# Patient Record
Sex: FEMALE | Race: WHITE | NOT HISPANIC OR LATINO
[De-identification: names, ages, dates, MRNs, and addresses within clinical notes are randomized per-mention and may not be internally consistent; named-entity substitution may affect disease eponyms.]

---

## 2017-11-11 PROBLEM — Z00.00 ENCOUNTER FOR PREVENTIVE HEALTH EXAMINATION: Status: ACTIVE | Noted: 2017-11-11

## 2017-11-16 ENCOUNTER — APPOINTMENT (OUTPATIENT)
Dept: MRI IMAGING | Facility: CLINIC | Age: 62
End: 2017-11-16
Payer: COMMERCIAL

## 2017-11-16 ENCOUNTER — OUTPATIENT (OUTPATIENT)
Dept: OUTPATIENT SERVICES | Facility: HOSPITAL | Age: 62
LOS: 1 days | End: 2017-11-16
Payer: COMMERCIAL

## 2017-11-16 DIAGNOSIS — Z00.8 ENCOUNTER FOR OTHER GENERAL EXAMINATION: ICD-10-CM

## 2017-11-16 PROCEDURE — 73718 MRI LOWER EXTREMITY W/O DYE: CPT

## 2017-11-16 PROCEDURE — 73718 MRI LOWER EXTREMITY W/O DYE: CPT | Mod: 26,LT

## 2017-11-22 ENCOUNTER — APPOINTMENT (OUTPATIENT)
Dept: ORTHOPEDIC SURGERY | Facility: CLINIC | Age: 62
End: 2017-11-22
Payer: COMMERCIAL

## 2017-11-22 VITALS
SYSTOLIC BLOOD PRESSURE: 130 MMHG | WEIGHT: 175 LBS | HEART RATE: 73 BPM | DIASTOLIC BLOOD PRESSURE: 83 MMHG | HEIGHT: 66 IN | BODY MASS INDEX: 28.12 KG/M2

## 2017-11-22 DIAGNOSIS — Z82.62 FAMILY HISTORY OF OSTEOPOROSIS: ICD-10-CM

## 2017-11-22 DIAGNOSIS — Z78.9 OTHER SPECIFIED HEALTH STATUS: ICD-10-CM

## 2017-11-22 DIAGNOSIS — Z86.018 PERSONAL HISTORY OF OTHER BENIGN NEOPLASM: ICD-10-CM

## 2017-11-22 PROCEDURE — 73610 X-RAY EXAM OF ANKLE: CPT | Mod: LT

## 2017-11-22 PROCEDURE — 99204 OFFICE O/P NEW MOD 45 MIN: CPT

## 2017-11-22 PROCEDURE — 73620 X-RAY EXAM OF FOOT: CPT | Mod: LT

## 2017-12-05 ENCOUNTER — RX RENEWAL (OUTPATIENT)
Age: 62
End: 2017-12-05

## 2018-01-22 ENCOUNTER — APPOINTMENT (OUTPATIENT)
Dept: ORTHOPEDIC SURGERY | Facility: CLINIC | Age: 63
End: 2018-01-22
Payer: COMMERCIAL

## 2018-01-22 VITALS
DIASTOLIC BLOOD PRESSURE: 81 MMHG | SYSTOLIC BLOOD PRESSURE: 122 MMHG | BODY MASS INDEX: 28.12 KG/M2 | HEART RATE: 78 BPM | HEIGHT: 66 IN | WEIGHT: 175 LBS

## 2018-01-22 DIAGNOSIS — M21.6X1 OTHER ACQUIRED DEFORMITIES OF RIGHT FOOT: ICD-10-CM

## 2018-01-22 DIAGNOSIS — M21.6X2 OTHER ACQUIRED DEFORMITIES OF RIGHT FOOT: ICD-10-CM

## 2018-01-22 PROCEDURE — 73630 X-RAY EXAM OF FOOT: CPT | Mod: LT

## 2018-01-22 PROCEDURE — 99214 OFFICE O/P EST MOD 30 MIN: CPT

## 2018-03-02 ENCOUNTER — APPOINTMENT (OUTPATIENT)
Dept: ORTHOPEDIC SURGERY | Facility: CLINIC | Age: 63
End: 2018-03-02
Payer: COMMERCIAL

## 2018-03-02 DIAGNOSIS — M21.172 VARUS DEFORMITY, NOT ELSEWHERE CLASSIFIED, LEFT ANKLE: ICD-10-CM

## 2018-03-02 DIAGNOSIS — M21.6X2 OTHER ACQUIRED DEFORMITIES OF LEFT FOOT: ICD-10-CM

## 2018-03-02 PROCEDURE — 99214 OFFICE O/P EST MOD 30 MIN: CPT

## 2018-03-22 ENCOUNTER — FORM ENCOUNTER (OUTPATIENT)
Age: 63
End: 2018-03-22

## 2018-03-23 ENCOUNTER — OUTPATIENT (OUTPATIENT)
Dept: OUTPATIENT SERVICES | Facility: HOSPITAL | Age: 63
LOS: 1 days | End: 2018-03-23
Payer: COMMERCIAL

## 2018-03-23 ENCOUNTER — APPOINTMENT (OUTPATIENT)
Dept: MRI IMAGING | Facility: CLINIC | Age: 63
End: 2018-03-23
Payer: COMMERCIAL

## 2018-03-23 DIAGNOSIS — Z00.8 ENCOUNTER FOR OTHER GENERAL EXAMINATION: ICD-10-CM

## 2018-03-23 PROCEDURE — 73721 MRI JNT OF LWR EXTRE W/O DYE: CPT

## 2018-03-23 PROCEDURE — 73721 MRI JNT OF LWR EXTRE W/O DYE: CPT | Mod: 26,LT

## 2018-04-04 ENCOUNTER — APPOINTMENT (OUTPATIENT)
Dept: ORTHOPEDIC SURGERY | Facility: CLINIC | Age: 63
End: 2018-04-04
Payer: COMMERCIAL

## 2018-04-04 DIAGNOSIS — M79.672 PAIN IN LEFT FOOT: ICD-10-CM

## 2018-04-04 DIAGNOSIS — M76.72 PERONEAL TENDINITIS, LEFT LEG: ICD-10-CM

## 2018-04-04 DIAGNOSIS — S86.319A STRAIN OF MUSCLE(S) AND TENDON(S) OF PERONEAL MUSCLE GROUP AT LOWER LEG LEVEL, UNSPECIFIED LEG, INITIAL ENCOUNTER: ICD-10-CM

## 2018-04-04 PROCEDURE — 99214 OFFICE O/P EST MOD 30 MIN: CPT

## 2018-10-23 ENCOUNTER — APPOINTMENT (OUTPATIENT)
Dept: OPHTHALMOLOGY | Facility: CLINIC | Age: 63
End: 2018-10-23
Payer: COMMERCIAL

## 2018-10-23 DIAGNOSIS — G43.109 MIGRAINE WITH AURA, NOT INTRACTABLE, W/OUT STATUS MIGRAINOSUS: ICD-10-CM

## 2018-10-23 PROCEDURE — 92133 CPTRZD OPH DX IMG PST SGM ON: CPT

## 2018-10-23 PROCEDURE — 92004 COMPRE OPH EXAM NEW PT 1/>: CPT

## 2018-11-05 ENCOUNTER — OUTPATIENT (OUTPATIENT)
Dept: OUTPATIENT SERVICES | Facility: HOSPITAL | Age: 63
LOS: 1 days | End: 2018-11-05
Payer: COMMERCIAL

## 2018-11-05 ENCOUNTER — TRANSCRIPTION ENCOUNTER (OUTPATIENT)
Age: 63
End: 2018-11-05

## 2018-11-05 ENCOUNTER — APPOINTMENT (OUTPATIENT)
Dept: MAMMOGRAPHY | Facility: CLINIC | Age: 63
End: 2018-11-05
Payer: COMMERCIAL

## 2018-11-05 ENCOUNTER — APPOINTMENT (OUTPATIENT)
Dept: RADIOLOGY | Facility: CLINIC | Age: 63
End: 2018-11-05
Payer: COMMERCIAL

## 2018-11-05 DIAGNOSIS — Z00.8 ENCOUNTER FOR OTHER GENERAL EXAMINATION: ICD-10-CM

## 2018-11-05 PROCEDURE — 77063 BREAST TOMOSYNTHESIS BI: CPT

## 2018-11-05 PROCEDURE — 77067 SCR MAMMO BI INCL CAD: CPT | Mod: 26

## 2018-11-05 PROCEDURE — 77063 BREAST TOMOSYNTHESIS BI: CPT | Mod: 26

## 2018-11-05 PROCEDURE — 77067 SCR MAMMO BI INCL CAD: CPT

## 2018-12-07 ENCOUNTER — APPOINTMENT (OUTPATIENT)
Dept: CARDIOLOGY | Facility: CLINIC | Age: 63
End: 2018-12-07
Payer: COMMERCIAL

## 2018-12-07 ENCOUNTER — NON-APPOINTMENT (OUTPATIENT)
Age: 63
End: 2018-12-07

## 2018-12-07 VITALS
BODY MASS INDEX: 29.73 KG/M2 | DIASTOLIC BLOOD PRESSURE: 75 MMHG | OXYGEN SATURATION: 97 % | HEIGHT: 66 IN | SYSTOLIC BLOOD PRESSURE: 124 MMHG | HEART RATE: 64 BPM | WEIGHT: 185 LBS

## 2018-12-07 VITALS — DIASTOLIC BLOOD PRESSURE: 75 MMHG | OXYGEN SATURATION: 97 % | HEART RATE: 64 BPM | SYSTOLIC BLOOD PRESSURE: 124 MMHG

## 2018-12-07 DIAGNOSIS — R94.31 ABNORMAL ELECTROCARDIOGRAM [ECG] [EKG]: ICD-10-CM

## 2018-12-07 DIAGNOSIS — R00.2 PALPITATIONS: ICD-10-CM

## 2018-12-07 PROCEDURE — 99204 OFFICE O/P NEW MOD 45 MIN: CPT

## 2018-12-07 PROCEDURE — 93000 ELECTROCARDIOGRAM COMPLETE: CPT

## 2018-12-10 ENCOUNTER — APPOINTMENT (OUTPATIENT)
Dept: CARDIOLOGY | Facility: CLINIC | Age: 63
End: 2018-12-10

## 2019-01-05 ENCOUNTER — APPOINTMENT (OUTPATIENT)
Dept: CARDIOLOGY | Facility: CLINIC | Age: 64
End: 2019-01-05
Payer: COMMERCIAL

## 2019-01-05 PROCEDURE — 93306 TTE W/DOPPLER COMPLETE: CPT

## 2019-01-17 ENCOUNTER — TRANSCRIPTION ENCOUNTER (OUTPATIENT)
Age: 64
End: 2019-01-17

## 2019-03-30 ENCOUNTER — TRANSCRIPTION ENCOUNTER (OUTPATIENT)
Age: 64
End: 2019-03-30

## 2019-10-02 ENCOUNTER — APPOINTMENT (OUTPATIENT)
Dept: DERMATOLOGY | Facility: CLINIC | Age: 64
End: 2019-10-02
Payer: COMMERCIAL

## 2019-10-02 DIAGNOSIS — L57.0 ACTINIC KERATOSIS: ICD-10-CM

## 2019-10-02 DIAGNOSIS — L82.1 OTHER SEBORRHEIC KERATOSIS: ICD-10-CM

## 2019-10-02 DIAGNOSIS — R23.8 OTHER SKIN CHANGES: ICD-10-CM

## 2019-10-02 DIAGNOSIS — D18.00 HEMANGIOMA UNSPECIFIED SITE: ICD-10-CM

## 2019-10-02 PROCEDURE — 17000 DESTRUCT PREMALG LESION: CPT

## 2019-10-02 PROCEDURE — 99204 OFFICE O/P NEW MOD 45 MIN: CPT | Mod: 25

## 2019-10-02 RX ORDER — MELOXICAM 7.5 MG/1
7.5 TABLET ORAL
Qty: 30 | Refills: 1 | Status: DISCONTINUED | COMMUNITY
Start: 2017-12-05 | End: 2019-10-02

## 2019-10-02 RX ORDER — ATORVASTATIN CALCIUM 40 MG/1
40 TABLET, FILM COATED ORAL
Qty: 30 | Refills: 0 | Status: DISCONTINUED | COMMUNITY
Start: 2017-11-21 | End: 2019-10-02

## 2020-01-23 ENCOUNTER — APPOINTMENT (OUTPATIENT)
Dept: OBGYN | Facility: CLINIC | Age: 65
End: 2020-01-23
Payer: COMMERCIAL

## 2020-01-23 VITALS
WEIGHT: 190 LBS | BODY MASS INDEX: 30.53 KG/M2 | SYSTOLIC BLOOD PRESSURE: 120 MMHG | DIASTOLIC BLOOD PRESSURE: 70 MMHG | HEIGHT: 66 IN

## 2020-01-23 DIAGNOSIS — N81.4 UTEROVAGINAL PROLAPSE, UNSPECIFIED: ICD-10-CM

## 2020-01-23 PROCEDURE — 82270 OCCULT BLOOD FECES: CPT

## 2020-01-23 PROCEDURE — 99396 PREV VISIT EST AGE 40-64: CPT

## 2020-01-23 PROCEDURE — 99213 OFFICE O/P EST LOW 20 MIN: CPT | Mod: 25

## 2020-02-29 ENCOUNTER — OUTPATIENT (OUTPATIENT)
Dept: OUTPATIENT SERVICES | Facility: HOSPITAL | Age: 65
LOS: 1 days | End: 2020-02-29
Payer: COMMERCIAL

## 2020-02-29 ENCOUNTER — APPOINTMENT (OUTPATIENT)
Dept: MAMMOGRAPHY | Facility: CLINIC | Age: 65
End: 2020-02-29
Payer: COMMERCIAL

## 2020-02-29 DIAGNOSIS — Z00.8 ENCOUNTER FOR OTHER GENERAL EXAMINATION: ICD-10-CM

## 2020-02-29 PROCEDURE — 77063 BREAST TOMOSYNTHESIS BI: CPT

## 2020-02-29 PROCEDURE — 77067 SCR MAMMO BI INCL CAD: CPT

## 2020-02-29 PROCEDURE — 77063 BREAST TOMOSYNTHESIS BI: CPT | Mod: 26

## 2020-02-29 PROCEDURE — 77067 SCR MAMMO BI INCL CAD: CPT | Mod: 26

## 2020-03-06 ENCOUNTER — OUTPATIENT (OUTPATIENT)
Dept: OUTPATIENT SERVICES | Facility: HOSPITAL | Age: 65
LOS: 1 days | End: 2020-03-06
Payer: COMMERCIAL

## 2020-03-06 ENCOUNTER — APPOINTMENT (OUTPATIENT)
Dept: RADIOLOGY | Facility: CLINIC | Age: 65
End: 2020-03-06
Payer: COMMERCIAL

## 2020-03-06 DIAGNOSIS — Z00.8 ENCOUNTER FOR OTHER GENERAL EXAMINATION: ICD-10-CM

## 2020-03-06 PROCEDURE — 77080 DXA BONE DENSITY AXIAL: CPT

## 2020-03-06 PROCEDURE — 77080 DXA BONE DENSITY AXIAL: CPT | Mod: 26

## 2020-03-14 LAB
CYTOLOGY CVX/VAG DOC THIN PREP: NORMAL
DATE COLLECTED: NORMAL
HEMOCCULT SP1 STL QL: NEGATIVE
QUALITY CONTROL: YES

## 2020-04-16 NOTE — HISTORY OF PRESENT ILLNESS
[Healthy Diet] : a healthy diet [Good] : being in good health [Last Mammogram ___] : Last Mammogram was [unfilled] [Last Bone Density ___] : Last bone density studies [unfilled] [Last Pap ___] : Last cervical pap smear was [unfilled] [Postmenopausal] : is postmenopausal [Pregnancy History] : pregnancy history: [Total Preg ___] : [unfilled] [Full Term ___] : [unfilled] [Living ___] : [unfilled] [unknown] : the patient is unsure of the date of her LMP [Menarche Age: ____] : age at menarche was [unfilled] [Regular Exercise] : not exercising regularly [Weight Concerns] : no concerns with her weight [Menstrual Problems] : reports normal menses [de-identified] : Pelvic u/s: 12/27/2016 [Hot Flashes] : no hot flashes [Night Sweats] : no night sweats [Vaginal Itching] : no vaginal itching [Dyspareunia] : no dyspareunia [Mood Changes] : no mood changes [Sexually Active] : is not sexually active [Contraception] : does not use contraception

## 2020-04-16 NOTE — REVIEW OF SYSTEMS
[Urgency] : urgency [Urinary Incontinence] : urinary incontinence [Nl] : Genitourinary [FreeTextEntry2] : Reflux

## 2020-04-16 NOTE — PHYSICAL EXAM
[Awake] : awake [Alert] : alert [Acute Distress] : no acute distress [Mass] : no breast mass [Nipple Discharge] : no nipple discharge [Axillary LAD] : no axillary lymphadenopathy [Soft] : soft [Tender] : non tender [Oriented x3] : oriented to person, place, and time [Depressed Mood] : not depressed [Labia Majora] : labia major [Labia Minora] : labia minora [Normal] : clitoris [Cystocele] : a cystocele [Rectocele] : a rectocele [No Bleeding] : there was no active vaginal bleeding [Normal Position] : in a normal position [Uterine Adnexae] : were not tender and not enlarged [No Tenderness] : no rectal tenderness [RRR, No Murmurs] : RRR, no murmurs [CTAB] : CTAB [FreeTextEntry5] : prolapses out to the introitus [FreeTextEntry7] : second to third degree prolapse

## 2020-04-26 ENCOUNTER — MESSAGE (OUTPATIENT)
Age: 65
End: 2020-04-26

## 2020-05-20 LAB
SARS-COV-2 IGG SERPL IA-ACNC: 0 INDEX
SARS-COV-2 IGG SERPL QL IA: NEGATIVE

## 2020-10-01 PROBLEM — M21.172 ACQUIRED HEEL VARUS OF LEFT FOOT: Status: ACTIVE | Noted: 2017-11-23

## 2020-10-08 ENCOUNTER — APPOINTMENT (OUTPATIENT)
Dept: DERMATOLOGY | Facility: CLINIC | Age: 65
End: 2020-10-08

## 2021-06-25 ENCOUNTER — APPOINTMENT (OUTPATIENT)
Dept: OBGYN | Facility: CLINIC | Age: 66
End: 2021-06-25
Payer: COMMERCIAL

## 2021-06-25 VITALS
WEIGHT: 183 LBS | DIASTOLIC BLOOD PRESSURE: 80 MMHG | SYSTOLIC BLOOD PRESSURE: 122 MMHG | TEMPERATURE: 97.2 F | BODY MASS INDEX: 29.41 KG/M2 | HEIGHT: 66 IN

## 2021-06-25 DIAGNOSIS — Z11.51 ENCOUNTER FOR SCREENING FOR HUMAN PAPILLOMAVIRUS (HPV): ICD-10-CM

## 2021-06-25 DIAGNOSIS — R10.2 PELVIC AND PERINEAL PAIN: ICD-10-CM

## 2021-06-25 DIAGNOSIS — Z01.419 ENCOUNTER FOR GYNECOLOGICAL EXAMINATION (GENERAL) (ROUTINE) W/OUT ABNORMAL FINDINGS: ICD-10-CM

## 2021-06-25 PROCEDURE — 99396 PREV VISIT EST AGE 40-64: CPT

## 2021-06-25 PROCEDURE — 99072 ADDL SUPL MATRL&STAF TM PHE: CPT

## 2021-06-25 NOTE — PHYSICAL EXAM
[Appropriately responsive] : appropriately responsive [Alert] : alert [No Acute Distress] : no acute distress [No Lymphadenopathy] : no lymphadenopathy [Soft] : soft [Non-tender] : non-tender [Non-distended] : non-distended [No HSM] : No HSM [No Lesions] : no lesions [No Mass] : no mass [Oriented x3] : oriented x3 [Examination Of The Breasts] : a normal appearance [No Discharge] : no discharge [No Masses] : no breast masses were palpable [Labia Majora] : normal [Labia Minora] : normal [Atrophy] : atrophy [Dry Mucosa] : dry mucosa [No Bleeding] : There was no active vaginal bleeding [Normal] : normal [Uterine Adnexae] : normal [FreeTextEntry3] : mobile thyroid, no masses, no nodules [FreeTextEntry6] : No cervical or axillary lymphadenopathy.

## 2021-06-25 NOTE — DISCUSSION/SUMMARY
[FreeTextEntry1] : 65 year old \par \par #gyn annual\par -ASCCP guidelines discussed. \par -Pap w/HPV done today\par -Script for MMG and breast US given\par \par #hx of osteopenia 2017\par -Prescription for DEXA studies were given. \par \par #Suprapubic pain\par -ucx/ua sent \par -Pelvic US ordered; plans to have it done w/ other imaging instead of our office \par Will call patient with US results when received; patient will call me if she the results have not been discussed 3 weeks after the study is done\par \par RTO in 1 year for annual exam or sooner prn

## 2021-06-25 NOTE — HISTORY OF PRESENT ILLNESS
[LMP unknown] : LMP unknown [N] : Patient is not sexually active [Y] : Positive pregnancy history [unknown] : Patient is unsure of the date of her LMP [Menarche Age: ____] : age at menarche was [unfilled] [Previously active] : previously active [Men] : men [No] : No [FreeTextEntry1] : 65 year old here for her annual exam. \par \par She is complaining of suprapubic pain on waking in the morning a couple of times a week for the past couple of months. She states it gets better throughout the day. Denies relief or worsening when voiding. \par \par Her last menstrual period was around 2004. She denies PMB since. \par \par She denies any history of abnormal pap smears. \par \par She denies FMHx of breast, ovarian and colon cancer.  [TextBox_4] : Pt presents for an Annual today. [Mammogramdate] : 01/23/20 [TextBox_19] : B2, benign calcifications [PapSmeardate] : 01/23/20 [TextBox_31] : neg pap, hpv [PGHxTotal] : 3 [[facility]] : 3 [[facility]] : 3

## 2021-06-25 NOTE — END OF VISIT
[FreeTextEntry3] : I, Amairani Cameron, solely acted as a scribe for Dr. Mia Barrientos on 06/25/2021. All medical entries made by the Scribe were at my, Dr. Barrientos's, direction and personally dictated by me on 06/25/2021. I have reviewed the chart and agree that the record accurately reflects my personal performance of the history, physical exam, assessment and plan. I have also personally directed, reviewed and agreed with the chart.

## 2021-06-30 LAB
BACTERIA UR CULT: NORMAL
HPV HIGH+LOW RISK DNA PNL CVX: NOT DETECTED

## 2021-07-06 LAB — CYTOLOGY CVX/VAG DOC THIN PREP: NORMAL

## 2021-07-14 ENCOUNTER — RESULT REVIEW (OUTPATIENT)
Age: 66
End: 2021-07-14

## 2021-07-14 ENCOUNTER — OUTPATIENT (OUTPATIENT)
Dept: OUTPATIENT SERVICES | Facility: HOSPITAL | Age: 66
LOS: 1 days | End: 2021-07-14
Payer: COMMERCIAL

## 2021-07-14 ENCOUNTER — APPOINTMENT (OUTPATIENT)
Dept: ULTRASOUND IMAGING | Facility: CLINIC | Age: 66
End: 2021-07-14
Payer: COMMERCIAL

## 2021-07-14 ENCOUNTER — APPOINTMENT (OUTPATIENT)
Dept: MAMMOGRAPHY | Facility: CLINIC | Age: 66
End: 2021-07-14
Payer: COMMERCIAL

## 2021-07-14 DIAGNOSIS — Z00.8 ENCOUNTER FOR OTHER GENERAL EXAMINATION: ICD-10-CM

## 2021-07-14 DIAGNOSIS — Z12.39 ENCOUNTER FOR OTHER SCREENING FOR MALIGNANT NEOPLASM OF BREAST: ICD-10-CM

## 2021-07-14 PROCEDURE — 77067 SCR MAMMO BI INCL CAD: CPT | Mod: 26

## 2021-07-14 PROCEDURE — 77063 BREAST TOMOSYNTHESIS BI: CPT

## 2021-07-14 PROCEDURE — 77063 BREAST TOMOSYNTHESIS BI: CPT | Mod: 26

## 2021-07-14 PROCEDURE — 76641 ULTRASOUND BREAST COMPLETE: CPT

## 2021-07-14 PROCEDURE — 76641 ULTRASOUND BREAST COMPLETE: CPT | Mod: 26,50

## 2021-07-14 PROCEDURE — 77067 SCR MAMMO BI INCL CAD: CPT

## 2021-09-02 ENCOUNTER — NON-APPOINTMENT (OUTPATIENT)
Age: 66
End: 2021-09-02

## 2021-09-12 ENCOUNTER — APPOINTMENT (OUTPATIENT)
Dept: ULTRASOUND IMAGING | Facility: CLINIC | Age: 66
End: 2021-09-12
Payer: COMMERCIAL

## 2021-09-12 ENCOUNTER — OUTPATIENT (OUTPATIENT)
Dept: OUTPATIENT SERVICES | Facility: HOSPITAL | Age: 66
LOS: 1 days | End: 2021-09-12
Payer: COMMERCIAL

## 2021-09-12 DIAGNOSIS — R10.2 PELVIC AND PERINEAL PAIN: ICD-10-CM

## 2021-09-12 PROCEDURE — 76830 TRANSVAGINAL US NON-OB: CPT

## 2021-09-12 PROCEDURE — 76856 US EXAM PELVIC COMPLETE: CPT

## 2021-09-12 PROCEDURE — 76856 US EXAM PELVIC COMPLETE: CPT | Mod: 26

## 2021-09-12 PROCEDURE — 76830 TRANSVAGINAL US NON-OB: CPT | Mod: 26

## 2021-09-20 ENCOUNTER — NON-APPOINTMENT (OUTPATIENT)
Age: 66
End: 2021-09-20

## 2021-09-20 DIAGNOSIS — N83.9 NONINFLAMMATORY DISORDER OF OVARY, FALLOPIAN TUBE AND BROAD LIGAMENT, UNSPECIFIED: ICD-10-CM

## 2021-09-22 ENCOUNTER — TRANSCRIPTION ENCOUNTER (OUTPATIENT)
Age: 66
End: 2021-09-22

## 2021-09-28 ENCOUNTER — NON-APPOINTMENT (OUTPATIENT)
Age: 66
End: 2021-09-28

## 2021-09-29 ENCOUNTER — TRANSCRIPTION ENCOUNTER (OUTPATIENT)
Age: 66
End: 2021-09-29

## 2021-09-30 ENCOUNTER — APPOINTMENT (OUTPATIENT)
Dept: OBGYN | Facility: CLINIC | Age: 66
End: 2021-09-30

## 2021-10-05 ENCOUNTER — TRANSCRIPTION ENCOUNTER (OUTPATIENT)
Age: 66
End: 2021-10-05

## 2021-10-11 ENCOUNTER — TRANSCRIPTION ENCOUNTER (OUTPATIENT)
Age: 66
End: 2021-10-11

## 2021-10-11 LAB
AFP-TM SERPL-MCNC: 3.4 NG/ML
CA 125 (LABCORP): 7.1 U/ML
CANCER AG125 SERPL-ACNC: 8 U/ML
CEA SERPL-MCNC: 2 NG/ML
HCG SERPL-MCNC: 1 MIU/ML
HCG-TM SERPL-MCNC: 1 MIU/ML
HE4X: 43.1 PMOL/L
INHIBIN B: <7 PG/ML
POSTMENOPAUSAL ROMA: 0.61
PREMENOPAUSAL ROMA: 0.51
ROMA COMMENT: NORMAL

## 2021-11-02 ENCOUNTER — APPOINTMENT (OUTPATIENT)
Dept: DERMATOLOGY | Facility: CLINIC | Age: 66
End: 2021-11-02
Payer: COMMERCIAL

## 2021-11-02 DIAGNOSIS — D22.9 MELANOCYTIC NEVI, UNSPECIFIED: ICD-10-CM

## 2021-11-02 DIAGNOSIS — L81.4 OTHER MELANIN HYPERPIGMENTATION: ICD-10-CM

## 2021-11-02 DIAGNOSIS — Z12.83 ENCOUNTER FOR SCREENING FOR MALIGNANT NEOPLASM OF SKIN: ICD-10-CM

## 2021-11-02 PROCEDURE — 99213 OFFICE O/P EST LOW 20 MIN: CPT

## 2021-11-02 NOTE — HISTORY OF PRESENT ILLNESS
[FreeTextEntry1] : skin check [de-identified] : 66 year old female here for skin check. no tx tried. family hx of NMSC. hx of AKs treated by LN2 by Dr. Cortes. Hx of bx of mildly atypical nevus in 2017.

## 2021-12-07 ENCOUNTER — APPOINTMENT (OUTPATIENT)
Dept: OBGYN | Facility: CLINIC | Age: 66
End: 2021-12-07
Payer: COMMERCIAL

## 2021-12-07 ENCOUNTER — NON-APPOINTMENT (OUTPATIENT)
Age: 66
End: 2021-12-07

## 2021-12-07 ENCOUNTER — ASOB RESULT (OUTPATIENT)
Age: 66
End: 2021-12-07

## 2021-12-07 VITALS
HEIGHT: 66 IN | WEIGHT: 175 LBS | DIASTOLIC BLOOD PRESSURE: 60 MMHG | BODY MASS INDEX: 28.12 KG/M2 | SYSTOLIC BLOOD PRESSURE: 102 MMHG

## 2021-12-07 PROCEDURE — 99213 OFFICE O/P EST LOW 20 MIN: CPT

## 2021-12-07 PROCEDURE — 76830 TRANSVAGINAL US NON-OB: CPT

## 2021-12-16 ENCOUNTER — TRANSCRIPTION ENCOUNTER (OUTPATIENT)
Age: 66
End: 2021-12-16

## 2021-12-18 ENCOUNTER — TRANSCRIPTION ENCOUNTER (OUTPATIENT)
Age: 66
End: 2021-12-18

## 2021-12-18 LAB — BRGYN CANCER PNL: ABNORMAL

## 2021-12-18 NOTE — DISCUSSION/SUMMARY
[FreeTextEntry1] : We discussed the findings of her pelvic sonogram which revealed the following: Anteverted uterus, 3.7 mm endometrium. Rt ovary has an echogenic lesion (suspected dermoid cyst) measuring 0.6 cm. Lt ovary appears WNL. No free fluid. \par \par Will repeat pelvic sonogram. \par \par Of note: Patient's daughter had a total hysterectomy and bilateral salpingo-oophorectomy secondary to ovarian cancer (borderline). Recommended consulting with a therapist for support. Contact information for Chasity Carroll given.\par \par All questions and concerns were addressed. \par \par Follow up in 6 months for a repeat pelvic sonogram or as needed. \par \par During this visit 20 minutes were spent face-to-face with greater than 50% of the time dedicated to counseling.

## 2021-12-18 NOTE — PHYSICAL EXAM
[Chaperone Present] : A chaperone was present in the examining room during all aspects of the physical examination [Appropriately responsive] : appropriately responsive [Alert] : alert [No Acute Distress] : no acute distress [Oriented x3] : oriented x3 [FreeTextEntry1] : Christopher

## 2021-12-18 NOTE — END OF VISIT
[FreeTextEntry3] : I, Amairani Cameron, solely acted as a scribe for Dr. Ronni Muse on 12/07/2021 . All medical entries made by the Scribe were at my, Dr. Muse's, direction and personally dictated by me on 12/07/2021. I have reviewed the chart and agree that the record accurately reflects my personal performance of the history, physical exam, assessment and plan. I have also personally directed, reviewed and agreed with the chart.

## 2021-12-18 NOTE — HISTORY OF PRESENT ILLNESS
[LMP unknown] : LMP unknown [postmenopausal] : postmenopausal [N] : Patient does not use contraception [Y] : Positive pregnancy history [unknown] : Patient is unsure of the date of her LMP [Menarche Age: ____] : age at menarche was [unfilled] [No] : Patient does not have concerns regarding sex [FreeTextEntry1] : ÁNGEL  presents for a follow up. \par \par She is doing well. \par \par Her pap smear from 06/25/21 was normal and HPV negative. \par \par  [Mammogramdate] : 7/14/21 [TextBox_19] : BR2 [BreastSonogramDate] : 7/14/21 [TextBox_25] : BR2 [PapSmeardate] : 6/25/21 [TextBox_31] : NEG [HPVDate] : 6/25/21 [TextBox_78] : NEG [PGHxTotal] : 3 [[facility]] : 3 [[facility] Centeriving] : 3

## 2022-01-31 ENCOUNTER — APPOINTMENT (OUTPATIENT)
Dept: OPHTHALMOLOGY | Facility: CLINIC | Age: 67
End: 2022-01-31
Payer: COMMERCIAL

## 2022-01-31 ENCOUNTER — NON-APPOINTMENT (OUTPATIENT)
Age: 67
End: 2022-01-31

## 2022-01-31 PROCEDURE — 92004 COMPRE OPH EXAM NEW PT 1/>: CPT

## 2022-02-03 ENCOUNTER — NON-APPOINTMENT (OUTPATIENT)
Age: 67
End: 2022-02-03

## 2022-02-03 ENCOUNTER — APPOINTMENT (OUTPATIENT)
Dept: OPHTHALMOLOGY | Facility: CLINIC | Age: 67
End: 2022-02-03
Payer: COMMERCIAL

## 2022-02-03 PROCEDURE — 92012 INTRM OPH EXAM EST PATIENT: CPT

## 2022-02-04 ENCOUNTER — EMERGENCY (EMERGENCY)
Facility: HOSPITAL | Age: 67
LOS: 1 days | Discharge: HOME | End: 2022-02-04
Attending: EMERGENCY MEDICINE
Payer: COMMERCIAL

## 2022-02-04 VITALS
WEIGHT: 179.9 LBS | RESPIRATION RATE: 18 BRPM | TEMPERATURE: 98 F | HEIGHT: 66 IN | DIASTOLIC BLOOD PRESSURE: 85 MMHG | SYSTOLIC BLOOD PRESSURE: 144 MMHG | OXYGEN SATURATION: 99 % | HEART RATE: 75 BPM

## 2022-02-04 LAB
ALBUMIN SERPL ELPH-MCNC: 5.3 G/DL — HIGH (ref 3.3–5)
ALP SERPL-CCNC: 87 U/L — SIGNIFICANT CHANGE UP (ref 40–120)
ALT FLD-CCNC: 15 U/L — SIGNIFICANT CHANGE UP (ref 10–45)
ANION GAP SERPL CALC-SCNC: 13 MMOL/L — SIGNIFICANT CHANGE UP (ref 5–17)
AST SERPL-CCNC: 15 U/L — SIGNIFICANT CHANGE UP (ref 10–40)
BASOPHILS # BLD AUTO: 0.02 K/UL — SIGNIFICANT CHANGE UP (ref 0–0.2)
BASOPHILS NFR BLD AUTO: 0.2 % — SIGNIFICANT CHANGE UP (ref 0–2)
BILIRUB SERPL-MCNC: 0.6 MG/DL — SIGNIFICANT CHANGE UP (ref 0.2–1.2)
BUN SERPL-MCNC: 14 MG/DL — SIGNIFICANT CHANGE UP (ref 7–23)
CALCIUM SERPL-MCNC: 10.1 MG/DL — SIGNIFICANT CHANGE UP (ref 8.4–10.5)
CHLORIDE SERPL-SCNC: 98 MMOL/L — SIGNIFICANT CHANGE UP (ref 96–108)
CO2 SERPL-SCNC: 24 MMOL/L — SIGNIFICANT CHANGE UP (ref 22–31)
CREAT SERPL-MCNC: 0.81 MG/DL — SIGNIFICANT CHANGE UP (ref 0.5–1.3)
CRP SERPL-MCNC: 11 MG/L — HIGH (ref 0–4)
EOSINOPHIL # BLD AUTO: 0.02 K/UL — SIGNIFICANT CHANGE UP (ref 0–0.5)
EOSINOPHIL NFR BLD AUTO: 0.2 % — SIGNIFICANT CHANGE UP (ref 0–6)
GLUCOSE SERPL-MCNC: 94 MG/DL — SIGNIFICANT CHANGE UP (ref 70–99)
HCT VFR BLD CALC: 41.7 % — SIGNIFICANT CHANGE UP (ref 34.5–45)
HGB BLD-MCNC: 14 G/DL — SIGNIFICANT CHANGE UP (ref 11.5–15.5)
IMM GRANULOCYTES NFR BLD AUTO: 0.3 % — SIGNIFICANT CHANGE UP (ref 0–1.5)
LYMPHOCYTES # BLD AUTO: 1.51 K/UL — SIGNIFICANT CHANGE UP (ref 1–3.3)
LYMPHOCYTES # BLD AUTO: 15.1 % — SIGNIFICANT CHANGE UP (ref 13–44)
MCHC RBC-ENTMCNC: 31.7 PG — SIGNIFICANT CHANGE UP (ref 27–34)
MCHC RBC-ENTMCNC: 33.6 GM/DL — SIGNIFICANT CHANGE UP (ref 32–36)
MCV RBC AUTO: 94.3 FL — SIGNIFICANT CHANGE UP (ref 80–100)
MONOCYTES # BLD AUTO: 0.83 K/UL — SIGNIFICANT CHANGE UP (ref 0–0.9)
MONOCYTES NFR BLD AUTO: 8.3 % — SIGNIFICANT CHANGE UP (ref 2–14)
NEUTROPHILS # BLD AUTO: 7.59 K/UL — HIGH (ref 1.8–7.4)
NEUTROPHILS NFR BLD AUTO: 75.9 % — SIGNIFICANT CHANGE UP (ref 43–77)
NRBC # BLD: 0 /100 WBCS — SIGNIFICANT CHANGE UP (ref 0–0)
PLATELET # BLD AUTO: 250 K/UL — SIGNIFICANT CHANGE UP (ref 150–400)
POTASSIUM SERPL-MCNC: 3.7 MMOL/L — SIGNIFICANT CHANGE UP (ref 3.5–5.3)
POTASSIUM SERPL-SCNC: 3.7 MMOL/L — SIGNIFICANT CHANGE UP (ref 3.5–5.3)
PROT SERPL-MCNC: 7.8 G/DL — SIGNIFICANT CHANGE UP (ref 6–8.3)
RBC # BLD: 4.42 M/UL — SIGNIFICANT CHANGE UP (ref 3.8–5.2)
RBC # FLD: 11.8 % — SIGNIFICANT CHANGE UP (ref 10.3–14.5)
SODIUM SERPL-SCNC: 135 MMOL/L — SIGNIFICANT CHANGE UP (ref 135–145)
TSH SERPL-MCNC: 1.52 UIU/ML — SIGNIFICANT CHANGE UP (ref 0.27–4.2)
WBC # BLD: 10 K/UL — SIGNIFICANT CHANGE UP (ref 3.8–10.5)
WBC # FLD AUTO: 10 K/UL — SIGNIFICANT CHANGE UP (ref 3.8–10.5)

## 2022-02-04 PROCEDURE — 99220: CPT

## 2022-02-04 RX ORDER — PREDNISOLONE SODIUM PHOSPHATE 1 %
1 DROPS OPHTHALMIC (EYE)
Refills: 0 | Status: DISCONTINUED | OUTPATIENT
Start: 2022-02-04 | End: 2022-02-08

## 2022-02-04 RX ORDER — ACETAMINOPHEN 500 MG
975 TABLET ORAL ONCE
Refills: 0 | Status: COMPLETED | OUTPATIENT
Start: 2022-02-04 | End: 2022-02-04

## 2022-02-04 RX ORDER — IBUPROFEN 200 MG
600 TABLET ORAL ONCE
Refills: 0 | Status: COMPLETED | OUTPATIENT
Start: 2022-02-04 | End: 2022-02-04

## 2022-02-04 RX ORDER — ACETAMINOPHEN 500 MG
975 TABLET ORAL ONCE
Refills: 0 | Status: COMPLETED | OUTPATIENT
Start: 2022-02-04 | End: 2022-02-05

## 2022-02-04 RX ORDER — IBUPROFEN 200 MG
600 TABLET ORAL EVERY 6 HOURS
Refills: 0 | Status: DISCONTINUED | OUTPATIENT
Start: 2022-02-04 | End: 2022-02-08

## 2022-02-04 RX ADMIN — Medication 1 DROP(S): at 22:18

## 2022-02-04 RX ADMIN — Medication 600 MILLIGRAM(S): at 17:27

## 2022-02-04 RX ADMIN — Medication 975 MILLIGRAM(S): at 17:27

## 2022-02-04 NOTE — ED PROVIDER NOTE - PROGRESS NOTE DETAILS
Attending MD Cheek: Radiology called, discussed with Ophtho, will obtain MRI Head and Orbits with and without instead of CTs, CDU aware, will come evaluate patient.

## 2022-02-04 NOTE — CONSULT NOTE ADULT - SUBJECTIVE AND OBJECTIVE BOX
[facility] DEPARTMENT OF OPHTHALMOLOGY - INITIAL ADULT CONSULT  ------------------------------------------------------------------------------------------------------------    HPI: 66y F w/ PMHx of HLD, Migraines presents to the ED c/o worsening L eye pain, swelling, and redness x1wk a/w intermittent HA. Endorses intermittent double vision starting today. Denies current double vision. Pt reports that she was seen twice previously Optho and dx'ed w/ episcleritis. Was prescribed w/ Prednisone eye drops and Ofloxacin that was stopped as per Optho recommendation. Denies smoking. Denies wearing contact lenses. Denies F/C, N/V/D.    Interval History: Seen by Dr. Steve Carolina in clinic. Started on PF and motrin which she says helps somewhat. Oflox started and stopped. States she felt L eye swelling improved several days ago then became worse. Has had intermittent diplopia which she states is now resolved.     PAST MEDICAL & SURGICAL HISTORY:  HLD (hyperlipidemia)    Migraines    No significant past surgical history      Past Ocular History: ? 1 prior episode of episcleritis.   Ophthalmic Medications: PF QID OS  FAMILY HISTORY:    MEDICATIONS  (STANDING):    MEDICATIONS  (PRN):    Allergies & Intolerances:     Review of Systems:  Constitutional: No fever, chills  Eyes: No blurry vision, flashes, floaters, FBS, erythema, discharge OU + intermittent double vision, + L eye pain and swelling  Neuro: No tremors  Cardiovascular: No chest pain, palpitations  Respiratory: No SOB, no cough  GI: No nausea, vomiting, abdominal pain  : No dysuria  Skin: no rash  Psych: no depression  Endocrine: no polyuria, polydipsia  Heme/lymph: no swelling    VITALS: T(C): 36.7 (02-04-22 @ 16:49)  T(F): 98 (02-04-22 @ 16:49), Max: 98 (02-04-22 @ 16:49)  HR: 75 (02-04-22 @ 16:49) (75 - 75)  BP: 144/85 (02-04-22 @ 16:49) (144/85 - 144/85)  RR:  (18 - 18)  SpO2:  (99% - 99%)  Wt(kg): --  General: AAO x 3, appropriate mood and affect    Ophthalmology Exam:  Visual acuity (sc): 20/25 OD, 20/20 OS  Pupils: PERRL OU, no APD  Ttono: 15 OD, 13 OS  Extraocular movements (EOMs): Full OD, 30% restriction in abduction OS  Confrontational Visual Field (CVF): Full OU  Color Plates: 12/12 OU    Pen Light Exam (PLE)  External: Flat OD, edema RINKU>LLL  Lids/Lashes/Lacrimal Ducts: Flat OD, edema RINKU>LLL   Sclera/Conjunctiva: W+Q OD, 2+ injection and chemosis, + cynthia with phenyl, injected vessels able to be moved with cotton applicator, mild tenderness OS  Cornea: 1+ SPK OU  Anterior Chamber: D+F OU    Iris: Flat OU  Lens: Cl OU    Fundus Exam: dilated with 1% tropicamide and 2.5% phenylephrine  Approval obtained from primary team for dilation  Patient aware that pupils can remained dilated for at least 4-6 hours  Exam performed with 20D lens    Vitreous: wnl OU  Disc, cup/disc: sharp and pink, 0.4 OU  Macula: wnl OU  Vessels: wnl OU  Periphery: wnl OU    Labs/Imaging:  ***   [facility] DEPARTMENT OF OPHTHALMOLOGY - INITIAL ADULT CONSULT  ------------------------------------------------------------------------------------------------------------    HPI: 66y F w/ PMHx of HLD, Migraines presents to the ED c/o worsening L eye pain, swelling, and redness x1wk a/w intermittent HA. Endorses intermittent double vision starting today. Denies current double vision. Pt reports that she was seen twice previously Optho and dx'ed w/ episcleritis. Was prescribed w/ Prednisone eye drops and Ofloxacin that was stopped as per Optho recommendation. Denies smoking. Denies wearing contact lenses. Denies F/C, N/V/D.    Interval History: Seen by Dr. Steve Carolina in clinic. Started on PF and motrin which she says helps somewhat. Oflox started and stopped. States she felt L eye swelling improved several days ago then became worse. Has had intermittent diplopia which she states is now resolved.     PAST MEDICAL & SURGICAL HISTORY:  HLD (hyperlipidemia)    Migraines    No significant past surgical history      Past Ocular History: ? 1 prior episode of episcleritis.   Ophthalmic Medications: PF QID OS  FAMILY HISTORY:    MEDICATIONS  (STANDING):    MEDICATIONS  (PRN):    Allergies & Intolerances:     Review of Systems:  Constitutional: No fever, chills  Eyes: No blurry vision, flashes, floaters, FBS, erythema, discharge OU + intermittent double vision, + L eye pain and swelling  Neuro: No tremors  Cardiovascular: No chest pain, palpitations  Respiratory: No SOB, no cough  GI: No nausea, vomiting, abdominal pain  : No dysuria  Skin: no rash  Psych: no depression  Endocrine: no polyuria, polydipsia  Heme/lymph: no swelling    VITALS: T(C): 36.7 (02-04-22 @ 16:49)  T(F): 98 (02-04-22 @ 16:49), Max: 98 (02-04-22 @ 16:49)  HR: 75 (02-04-22 @ 16:49) (75 - 75)  BP: 144/85 (02-04-22 @ 16:49) (144/85 - 144/85)  RR:  (18 - 18)  SpO2:  (99% - 99%)  Wt(kg): --  General: AAO x 3, appropriate mood and affect    Ophthalmology Exam:  Visual acuity (sc): 20/25 OD, 20/20 OS  Pupils: PERRL OU, no APD  Ttono: 15 OD, 13 OS  Extraocular movements (EOMs): Full OD, 30% restriction of abduction OS, pain with L gaze OS  Confrontational Visual Field (CVF): Full OU  Color Plates: 12/12 OU    Pen Light Exam (PLE)  External: Flat OD, edema RINKU>LLL  Lids/Lashes/Lacrimal Ducts: Flat OD, edema RINKU>LLL, tenderness superotemporally OS  Sclera/Conjunctiva: W+Q OD, 2+ injection and chemosis, + cynthia with phenyl, injected vessels able to be moved with cotton applicator OS  Cornea: 1+ SPK OU  Anterior Chamber: D+F OU    Iris: Flat OU  Lens: Cl OU    Fundus Exam: dilated with 1% tropicamide and 2.5% phenylephrine  Approval obtained from primary team for dilation  Patient aware that pupils can remained dilated for at least 4-6 hours  Exam performed with 20D lens    Vitreous: wnl OU  Disc, cup/disc: sharp and pink, 0.3 OU  Macula: wnl OU  Vessels: wnl OU  Periphery: wnl OU

## 2022-02-04 NOTE — ED ADULT NURSE NOTE - NSIMPLEMENTINTERV_GEN_ALL_ED
Implemented All Universal Safety Interventions:  [place] to call system. Call bell, personal items and telephone within reach. Instruct patient to call for assistance. Room bathroom lighting operational. Non-slip footwear when patient is off stretcher. Physically safe environment: no spills, clutter or unnecessary equipment. Stretcher in lowest position, wheels locked, appropriate side rails in place.

## 2022-02-04 NOTE — ED PROVIDER NOTE - ATTENDING CONTRIBUTION TO CARE
Attending MD Cheek: I personally have seen and examined this patient.  NP note reviewed and agree on plan of care and except where noted.  See below for details.     Seen in [place]  Ophtho Dr. Carolina    66F with PMH/PSH including HLD, migraines presents to the ED sent in by Ophthlamologist for worsening L eye pain and swelling.  Reports symptoms started about a week ago with pain and swelling.  Reports felt the swelling in her eye "sclera" patient reports swelling.  Reports saw Ophthalmologist at start of week and was Rx'ed Ocuflox and steroids and then again Thursday when she was taken off Ocuflox.  Reports has noted increased tearing, photophobia.  Reports pain with EOM.  Denies change in vision, sudden loss of vision.  Reports today had episode of double vision, not present now.  Denies ever contact lenses and reports distance/reading glasses.  Denies nausea, vomiting, diarrhea, abdominal pain, chest pain, shortness of breath, LE edema.  Denies fevers, chills, previous episodes.  Denies headache.  A ten (10) point review of systems was negative other than as stated in the HPI or elsewhere in the chart.    Exam:   General: NAD  HENT: head NCAT, airway patent   Eyes: PERRL, EOM with decreased ABduction OS, +pain with ABduction OS (reports pain in all cardinal directions but most notably in ABduction OS), confrontational VF full, Va cc OD 20/30-2, OS 20/40+2, no periorbital ecchymosis, +edema to RINKU>LLL, no tenderness to palpation of orbital rim, no retained foreign body on lid eversion, no conjunctival injection OD, +2 conjunctival injection and chemosis OS, SPK OU, AC no cells or flare, fundus exam deferred, T OD 11, OS 13  Chest: symmetric chest rise, no increased work of breathing  MSK: ranging neck freely  Neuro: moving all extremities spontaneously, sensory grossly intact, no gross neuro deficits  Psych: normal mood and affect     A/P: 66F with OS chemosis, conjunctival injection, pain with EOM ABduction OS, will obtain labs, pending Ophtho eval sent in for 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

## 2022-02-04 NOTE — ED ADULT TRIAGE NOTE - NS ED TRIAGE AVPU SCALE
Quality 110: Preventive Care And Screening: Influenza Immunization: Influenza Immunization previously received during influenza season Detail Level: Detailed Alert-The patient is alert, awake and responds to voice. The patient is oriented to time, place, and person. The triage nurse is able to obtain subjective information.

## 2022-02-04 NOTE — ED ADULT NURSE NOTE - OBJECTIVE STATEMENT
pt states she has had swelling to left eye, was given prednisone drops which are not helping. Has pain around eye and when moving eye. Sent by doctor for CT scan

## 2022-02-04 NOTE — CONSULT NOTE ADULT - ASSESSMENT
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    1. 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  - Visual acuity (sc): 20/25 OD, 20/20 OS, Pupils: PERRL OU, no APD, Ttono: 15 OD, 13 OS, Extraocular movements (EOMs): Full OD, 30% restriction of abduction OS, pain with L gaze OS, Confrontational Visual Field (CVF): Full OU, Color Plates: 12/12 OU  - Exam notable for edema RINKU>LLL, tenderness superotemporally OS over lacrimal gland, 2+ injection and chemosis, + cynthia with phenyl, injected vessels able to be moved with cotton applicator OS  - DFE unremarkable  - Pt with mild improvement with Motrin and Pred forte eye drops then worsening  - Continue with Pred Forte QID in L eye  - Start Artificial Tears preservative-free QID in both eyes  - Recommend MRI brain and orbits w/wo IV contrast    Outpatient follow-up: Patient should follow-up with his/her ophthalmologist or with [facility] Department of Ophthalmology at the address below     [address] [place] [address]. [address]  [place] [address]  [phone]    Case discussed with Dr. Carolina, attending.    Rosalind KILLIAN Rai, MD  PGY-2, Ophthalmology  [phone]    Also available on Microsoft Teams.

## 2022-02-04 NOTE — ED ADULT NURSE NOTE - NSSEPSISSUSPECTED_ED_A_ED
Ghislaine Walls is overdue for INR check.      Spoke with Ghislaine and patient will have INR checked at next office visit on 2/10     Sophia Jean-Baptiste RN - [facility]       Yes

## 2022-02-04 NOTE — ED PROVIDER NOTE - OBJECTIVE STATEMENT
66y F w/ PMHx of HLD, Migraines presents to the ED c/o worsening L eye pain, swelling, and redness x1wk a/w intermittent HA. Endorses intermittent double vision starting today. Denies current double vision. Pt reports that she was seen twice previously Optho and dx'ed w/ episcleritis. Was prescribed w/ Prednisone eye drops and Moxifloxacin that was stopped as per Optho recommendation. Denies smoking. Denies wearing contact lenses. Denies F/C, N/V/D.  Dr. Carolina T:[phone] 66y F w/ PMHx of HLD, Migraines presents to the ED c/o worsening L eye pain, swelling, and redness x1wk a/w intermittent HA. Endorses intermittent double vision starting today. Denies current double vision. Pt reports that she was seen twice previously Optho and dx'ed w/ episcleritis. Was prescribed w/ Prednisone eye drops and Ofloxacin that was stopped as per Optho recommendation. Denies smoking. Denies wearing contact lenses. Denies F/C, N/V/D.  Dr. Carolina T:[phone]

## 2022-02-04 NOTE — ED ADULT TRIAGE NOTE - SPO2 (%)
Internal Medicine Progress note       Patient: Flaca Munoz Date:12/10/2018     : 1944 Attending: Clifford Patel MD   74 year old female 2018          CC:      Recent Events/Subjective:     Resting in chair  Some nasal congestion, denies SOB, minimal cough  No fevers  Tolerating po diet well   No abd complaints, nausea or emesis, + BMs   No other complaints or events  Remains on IVFs                Vital Last Value 24 Hour Range   Temperature 99.1 °F (37.3 °C) Temp  Min: 96.6 °F (35.9 °C)  Max: 99.5 °F (37.5 °C)   Pulse 65 Pulse  Min: 65  Max: 78   Respiratory 16 Resp  Min: 16  Max: 18   Blood Pressure 148/62 BP  Min: 142/68  Max: 148/62   Arterial BP   No Data Recorded   O2 Sat 2 L/min NA   Pulse Oximetry 92 % SpO2  Min: 92 %  Max: 97 %     Vital Today Admitted   Weight 65.2 kg Weight: 57.1 kg   BMI N/A BMI (Calculated): 26.36          Last I/O 3 shifts  I/O last 3 completed shifts:  In: 480 [P.O.:100; I.V.:380]  Out: -     Physical Exam:       General appearance: NAD, resting comfortably   Head: Normocephalic,  Atraumatic, no obvious abnormalities   Eyes: PERRL, no drainage, exudates or erythema   Neck: supple, full ROM, no JVD, trachea midline,   Lungs: normal efforts, symmetrical expansion, no wheezes, rhonchi,rales   Heart: RRR and S1, S2 normal,    Abdomen: soft, NT/ND, normal BT, no organomegaly    Extremities: no edema/cyanosis, warm, no cyanosis or clubbing   Pulses: 2+ and symmetric radial, dorsalis pedis   Skin: WDI, no rashes, lesions or wounds   Neurologic: AAOx2-3, Renato, no focal motor weakness,        Labs  Recent Labs   Lab 12/10/18  0349 18  0334 18  1045   WBC  --  7.9 12.0*   HGB  --  8.8* 10.7*   HCT  --  28.6* 34.7*   PLT  --  139* 160   SEG  --  55 71   SODIUM 146* 145 142   POTASSIUM 4.2 4.5 5.0   CHLORIDE 115* 113* 105   CO2 27 29 30   ANIONGAP 8* 8* 12   BUN 29* 39* 53*   CREATININE 0.78 1.13* 2.29*   GFRNA 76 49 21   GFRA 88 57 24   GLUCOSE 77 77 101*    CALCIUM 7.4* 7.4* 8.3*   ALBUMIN  --  2.0* 2.9*   AST  --  69* 52*   GPT  --  26 29   ALKPT  --  64 86   BILIRUBIN  --  0.2 0.2   CPK  --  1,184* 1,018*         Medications/Infusions:       Scheduled:   • morphine SR  15 mg Oral BID   • sodium chloride (PF)  2 mL Injection 2 times per day   • heparin (porcine)  5,000 Units Subcutaneous 3 times per day   • acetaminophen  650 mg Oral TID   • aspirin  81 mg Oral Daily   • beclomethasone diprop HFA  1 puff Inhalation BID Resp   • calcium carbonate  500 mg Oral BID   • calcium carbonate-vitamin D  1 tablet Oral Daily with breakfast   • loratadine  10 mg Oral QAM AC   • clopidogrel  75 mg Oral Daily   • divalproex  375 mg Oral 2 times per day   • levothyroxine  50 mcg Oral QAM AC   • melatonin  3 mg Oral Nightly   • memantine  5 mg Oral QHS   • fluticasone  2 spray Each Nare Nightly   • OLANZapine  5 mg Oral Nightly   • pantoprazole  40 mg Oral Nightly   • prednisoLONE sod-phos  1 drop Left Eye Nightly   • risperiDONE  0.5 mg Oral BID   • rivastigmine  1 patch Transdermal Daily   • sertraline  50 mg Oral Daily   • rivastigmine  1 patch Transdermal Daily   • morphine SR  30 mg Oral Nightly       Continuous Infusions:   • sodium chloride 0.9% infusion 75 mL/hr at 12/10/18 0625     .         Radiology/Imaging: reviewed              Assessment:   Acute kidney injury   Dehydration   Encephalopathy   HTN   Chronic pain   H/o CAD   Hypothyroidism      Plan & Recommendations:   IVFs - decrease rate   Renal function returned to normal , repeat prn   Monitor mentation  Oral diet as tolerated  Asa, statin, plavix   Monitor BP trend - stable at present  HGB low stable, follow   Chronic analgesic regimen   Heparin SQ  TSH noted okay, continue current levothyroxine dosing  DC planning- return to facility in am              Khalida Hoover NP  12/10/2018    As above  Better  Cap IVF in AM  GH tomorrow if stable         99

## 2022-02-04 NOTE — ED PROVIDER NOTE - DISPOSITION TYPE
Susanne called stating she reached out to [facility] because pt has 2 weeks of Prdostigmine Bromide left but was told medication is discontinued. She's requesting a call back to discuss alternatives. She's also wants to know why 45 day supply was prescribed instead of the usual 90 day. [phone] okay to leave [facility].    OBSERVATION

## 2022-02-05 VITALS
HEART RATE: 74 BPM | SYSTOLIC BLOOD PRESSURE: 113 MMHG | DIASTOLIC BLOOD PRESSURE: 74 MMHG | OXYGEN SATURATION: 98 % | TEMPERATURE: 98 F | RESPIRATION RATE: 17 BRPM

## 2022-02-05 LAB
ERYTHROCYTE [SEDIMENTATION RATE] IN BLOOD: 39 MM/HR — HIGH (ref 0–20)
RHEUMATOID FACT SERPL-ACNC: <10 IU/ML — SIGNIFICANT CHANGE UP (ref 0–13)
SARS-COV-2 RNA SPEC QL NAA+PROBE: SIGNIFICANT CHANGE UP
T PALLIDUM AB TITR SER: NEGATIVE — SIGNIFICANT CHANGE UP

## 2022-02-05 PROCEDURE — 86225 DNA ANTIBODY NATIVE: CPT

## 2022-02-05 PROCEDURE — 70543 MRI ORBT/FAC/NCK W/O &W/DYE: CPT | Mod: 26,MA

## 2022-02-05 PROCEDURE — 36415 COLL VENOUS BLD VENIPUNCTURE: CPT

## 2022-02-05 PROCEDURE — 85025 COMPLETE CBC W/AUTO DIFF WBC: CPT

## 2022-02-05 PROCEDURE — 84443 ASSAY THYROID STIM HORMONE: CPT

## 2022-02-05 PROCEDURE — 70543 MRI ORBT/FAC/NCK W/O &W/DYE: CPT | Mod: MA

## 2022-02-05 PROCEDURE — 86780 TREPONEMA PALLIDUM: CPT

## 2022-02-05 PROCEDURE — U0005: CPT

## 2022-02-05 PROCEDURE — 86431 RHEUMATOID FACTOR QUANT: CPT

## 2022-02-05 PROCEDURE — 96374 THER/PROPH/DIAG INJ IV PUSH: CPT | Mod: XU

## 2022-02-05 PROCEDURE — 80053 COMPREHEN METABOLIC PANEL: CPT

## 2022-02-05 PROCEDURE — 99284 EMERGENCY DEPT VISIT MOD MDM: CPT | Mod: 25

## 2022-02-05 PROCEDURE — 86618 LYME DISEASE ANTIBODY: CPT

## 2022-02-05 PROCEDURE — 70553 MRI BRAIN STEM W/O & W/DYE: CPT | Mod: 26,MA

## 2022-02-05 PROCEDURE — 85652 RBC SED RATE AUTOMATED: CPT

## 2022-02-05 PROCEDURE — 99217: CPT

## 2022-02-05 PROCEDURE — 82164 ANGIOTENSIN I ENZYME TEST: CPT

## 2022-02-05 PROCEDURE — 86038 ANTINUCLEAR ANTIBODIES: CPT

## 2022-02-05 PROCEDURE — 86036 ANCA SCREEN EACH ANTIBODY: CPT

## 2022-02-05 PROCEDURE — U0003: CPT

## 2022-02-05 PROCEDURE — G0378: CPT

## 2022-02-05 PROCEDURE — 70553 MRI BRAIN STEM W/O & W/DYE: CPT | Mod: MA

## 2022-02-05 PROCEDURE — A9585: CPT

## 2022-02-05 PROCEDURE — 86140 C-REACTIVE PROTEIN: CPT

## 2022-02-05 RX ORDER — ACETAMINOPHEN 500 MG
975 TABLET ORAL ONCE
Refills: 0 | Status: COMPLETED | OUTPATIENT
Start: 2022-02-05 | End: 2022-02-05

## 2022-02-05 RX ORDER — FAMOTIDINE 10 MG/ML
1 INJECTION INTRAVENOUS
Qty: 28 | Refills: 0
Start: 2022-02-05 | End: 2022-02-18

## 2022-02-05 RX ORDER — PREDNISOLONE SODIUM PHOSPHATE 1 %
1 DROPS OPHTHALMIC (EYE)
Qty: 1 | Refills: 0
Start: 2022-02-05

## 2022-02-05 RX ORDER — ROSUVASTATIN CALCIUM 5 MG/1
1 TABLET ORAL
Qty: 0 | Refills: 0 | DISCHARGE

## 2022-02-05 RX ORDER — NORTRIPTYLINE HYDROCHLORIDE 10 MG/1
0 CAPSULE ORAL
Qty: 0 | Refills: 0 | DISCHARGE

## 2022-02-05 RX ORDER — DEXAMETHASONE 0.5 MG/5ML
8 ELIXIR ORAL ONCE
Refills: 0 | Status: COMPLETED | OUTPATIENT
Start: 2022-02-05 | End: 2022-02-05

## 2022-02-05 RX ADMIN — Medication 1 DROP(S): at 10:22

## 2022-02-05 RX ADMIN — Medication 1 DROP(S): at 16:25

## 2022-02-05 RX ADMIN — Medication 600 MILLIGRAM(S): at 11:10

## 2022-02-05 RX ADMIN — Medication 1 DROP(S): at 16:26

## 2022-02-05 RX ADMIN — Medication 1 DROP(S): at 04:25

## 2022-02-05 RX ADMIN — Medication 975 MILLIGRAM(S): at 00:11

## 2022-02-05 RX ADMIN — Medication 1 DROP(S): at 10:23

## 2022-02-05 RX ADMIN — Medication 975 MILLIGRAM(S): at 11:10

## 2022-02-05 RX ADMIN — Medication 600 MILLIGRAM(S): at 01:11

## 2022-02-05 RX ADMIN — Medication 975 MILLIGRAM(S): at 10:40

## 2022-02-05 RX ADMIN — Medication 600 MILLIGRAM(S): at 10:39

## 2022-02-05 RX ADMIN — Medication 975 MILLIGRAM(S): at 01:11

## 2022-02-05 RX ADMIN — Medication 600 MILLIGRAM(S): at 00:12

## 2022-02-05 RX ADMIN — Medication 101.6 MILLIGRAM(S): at 13:21

## 2022-02-05 NOTE — ED CDU PROVIDER DISPOSITION NOTE - PATIENT PORTAL LINK FT
You can access the FollowMyHealth Patient Portal offered by [facility] by registering at the following website: [email or web address]. By joining [employer]’s FollowMyHealth portal, you will also be able to view your health information using other applications (apps) compatible with our system.

## 2022-02-05 NOTE — ED ADULT NURSE REASSESSMENT NOTE - COMFORT CARE
assisted to bathroom/ambulated to bathroom/darkened lights/plan of care explained/po fluids offered/repositioned/warm blanket provided

## 2022-02-05 NOTE — ED ADULT NURSE REASSESSMENT NOTE - NS ED NURSE REASSESS COMMENT FT1
Received pt from [facility] , received pt alert and responsive, oriented x4, denies any respiratory distress, SOB, or difficulty breathing. Pt transferred to CDU for L eye pain, swelling. Endorses blurred vision and 7/10 pain to site, will medicate per order. Pending ocular prednisone and artifical tears q6 and MRI, pt aware of plan.  IV in place, patent and free of signs of infiltration,  V/S stable, pt afebrile.  Pt educated on unit and unit rules, instructed patient to notify RN of any needed assistance, Pt verbalizes understanding, Call bell placed within reach. Safety maintained. Will continue to monitor.

## 2022-02-05 NOTE — ED CDU PROVIDER DISPOSITION NOTE - CLINICAL COURSE
66y F w/ PMHx of HLD, Migraines presents to the ED c/o worsening L eye pain, swelling, and redness x1wk a/w intermittent HA. Endorses intermittent double vision starting today. Denies current double vision. Pt reports that she was seen twice previously Optho and dx'ed w/ episcleritis. Was prescribed w/ Prednisone eye drops and Ofloxacin that was stopped as per Optho recommendation. Took Motrin for pain without relief. Denies smoking. Denies wearing contact lenses. Denies F/C, N/V/D.  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 Plan for eye drops, pain control, MRI in CDU. 66y F w/ PMHx of HLD, Migraines presents to the ED c/o worsening L eye pain, swelling, and redness x1wk a/w intermittent HA. Endorses intermittent double vision starting today. Denies current double vision. Pt reports that she was seen twice previously Optho and dx'ed w/ episcleritis. Was prescribed w/ Prednisone eye drops and Ofloxacin that was stopped as per Optho recommendation. Took Motrin for pain without relief. Denies smoking. Denies wearing contact lenses. Denies F/C, N/V/D.  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 Plan for eye drops, pain control, MRI in CDU.  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

## 2022-02-05 NOTE — ED CDU PROVIDER INITIAL DAY NOTE - PHYSICAL EXAMINATION
GEN: Well Appearing, Nontoxic, NAD  HEENT: NC/AT, Symm Facies. Pupils dilated.   CV: No JVD/Bruits or stridor;  +S1S2, RRR w/o m/g/r  RESP: CTAB w/o w/r/r  ABD: Soft, nt/nd, +BS.  EXT/MSK: No lower extremity edema or calf tenderness.  Psych: Appropriate mood and affect   Neuro: Grossly intact, AOX3 with normal speech, moving all extremities, steady gait. GEN: Well Appearing, Nontoxic, NAD  HEENT: NC/AT, Symm Facies. Pupils dilated. EOMI. Left eye: Conjunctiva injected, Upper Lid with erythema, edema and tender to palpation.  CV: No JVD/Bruits or stridor;  +S1S2, RRR w/o m/g/r  RESP: CTAB w/o w/r/r  ABD: Soft, nt/nd, +BS.  EXT/MSK: No lower extremity edema or calf tenderness.  Psych: Appropriate mood and affect   Neuro: Grossly intact, AOX3 with normal speech, moving all extremities, steady gait.

## 2022-02-05 NOTE — ED CDU PROVIDER INITIAL DAY NOTE - OBJECTIVE STATEMENT
66y F w/ PMHx of HLD, Migraines presents to the ED c/o worsening L eye pain, swelling, and redness x1wk a/w intermittent HA. Endorses intermittent double vision starting today. Denies current double vision. Pt reports that she was seen twice previously Optho and dx'ed w/ episcleritis. Was prescribed w/ Prednisone eye drops and Ofloxacin that was stopped as per Optho recommendation. Took Motrin for pain without relief. Denies smoking. Denies wearing contact lenses. Denies F/C, N/V/D.  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 Plan for eye drops, pain control, MRI in CDU.

## 2022-02-05 NOTE — ED CDU PROVIDER SUBSEQUENT DAY NOTE - PHYSICAL EXAMINATION
GEN: Well Appearing, Nontoxic, NAD  HEENT: NC/AT, Symm Facies. Pupils dilated. EOMI. Left eye: Conjunctiva injected, Upper Lid with erythema, edema and tender to palpation.  CV: No JVD/Bruits or stridor;  +S1S2, RRR w/o m/g/r  RESP: CTAB w/o w/r/r  ABD: Soft, nt/nd, +BS.  EXT/MSK: No lower extremity edema or calf tenderness.  Psych: Appropriate mood and affect   Neuro: Grossly intact, AOX3 with normal speech, moving all extremities, steady gait.

## 2022-02-05 NOTE — ED CDU PROVIDER INITIAL DAY NOTE - NS ED ROS FT
Constitutional: No fever or chills  Eyes: +DIPLOPIA, REDNESS, L eye swelling and pain.  CV: No chest pain or lower extremity edema  Resp: No SOB no cough  GI: No abd pain. No nausea or vomiting. No diarrhea.   : No dysuria, hematuria.   MSK: No musculoskeletal pain  Skin: No rash  Psych: No complaints   Neuro: + headache. No numbness or tingling. No weakness.  Endo: No DM

## 2022-02-05 NOTE — ED CDU PROVIDER INITIAL DAY NOTE - ATTENDING CONTRIBUTION TO CARE
Attending MD Cheek:   I personally have seen and examined this patient.  Physician assistant note reviewed and agree on plan of care and except where noted.  See below for details.     Seen in [place]  Ophtho Dr. Carolina    66F with PMH/PSH including HLD, migraines presents to the ED sent in by Ophthlamologist for worsening L eye pain and swelling.  Reports symptoms started about a week ago with pain and swelling.  Reports felt the swelling in her eye "sclera" patient reports swelling.  Reports saw Ophthalmologist at start of week and was Rx'ed Ocuflox and steroids and then again Thursday when she was taken off Ocuflox.  Reports has noted increased tearing, photophobia.  Reports pain with EOM.  Denies change in vision, sudden loss of vision.  Reports today had episode of double vision, not present now.  Denies ever contact lenses and reports distance/reading glasses.  Denies nausea, vomiting, diarrhea, abdominal pain, chest pain, shortness of breath, LE edema.  Denies fevers, chills, previous episodes.  Denies headache.  A ten (10) point review of systems was negative other than as stated in the HPI or elsewhere in the chart.    Exam:   General: NAD  HENT: head NCAT, airway patent   Eyes: PERRL, EOM with decreased ABduction OS, +pain with ABduction OS (reports pain in all cardinal directions but most notably in ABduction OS), confrontational VF full, Va cc OD 20/30-2, OS 20/40+2, no periorbital ecchymosis, +edema to RINKU>LLL, no tenderness to palpation of orbital rim, no retained foreign body on lid eversion, no conjunctival injection OD, +2 conjunctival injection and chemosis OS, SPK OU, AC no cells or flare, fundus exam deferred, T OD 11, OS 13  Chest: symmetric chest rise, no increased work of breathing  MSK: ranging neck freely  Neuro: moving all extremities spontaneously, sensory grossly intact, no gross neuro deficits  Psych: normal mood and affect     A/P: 66F with OS chemosis, conjunctival injection, pain with EOM ABduction OS, ophtho evaluated patient, suspect IOIS, ophtho discussed imaging with neurorads, recommended MRI head and orbits with and without, will place in CDU for frequent re-evals, MRI and final ophtho recs

## 2022-02-05 NOTE — ED CDU PROVIDER SUBSEQUENT DAY NOTE - PROGRESS NOTE DETAILS
CDU NOTE MAURA Maya: pt resting, feels about the same. unchanged. no blurred vision at this time. NAD VSS. L eye- +chemosis, +ricco-orbital inflammation/swelling involving eyelids. able to fully move eyes. awaiting MRI CDU NOTE MAURA Maya: pt seen by Ophtho- reviewed MRI, recommend Decadron 8mg IV and then will reeval CDU NOTE MAURA Maya: pt resting comfortably, feels improved after Decadron. NAD VSS. L eye- periorbital swelling improved.  seen by Ophtho again- ok to d/c home on prednisone 80mg daily for 2 weeks, with pepcid. f/up outpt with ocular surgeon Dr. Mills on Tuesday.  as per Dr. Garcia, ok to d/c home

## 2022-02-05 NOTE — ED CDU PROVIDER DISPOSITION NOTE - NSFOLLOWUPINSTRUCTIONS_ED_ALL_ED_FT
Hydrate.     Please take Tylenol 650mg and Motrin 600mg every 6 hours as needed for pain.    Continue with Pred Forte four times a day in left eye. Start Artificial Tears preservative-free four times a in both eyes.     We recommend you follow up with your primary care provider within the next 2-3 days, please bring all of your results with you.     You can also follow up with your ophthalmologist within 1 week of discharge.     Please return to the Emergency Department with new, worsening, or concerning symptoms, such as:  -Worsening eye pain/visual loss   -Shortness of breath or trouble breathing  -Pressure, pain, tightness in chest  -Facial drooping, arm weakness, or speech difficulty   -Head injury or loss of consciousness   -Nonstop bleeding or an open wound     *More detailed information regarding your visit and discharge can be found by reviewing this packet Hydrate.     Please take Tylenol 650mg every 6hrs for pain as needed.    Take Prednisone 80mg daily for 2 weeks ***this will need to be tapered (slowly decreased) by the Ocular Surgeon, please make sure to get a prescription for them so that the prednisone can be tapered.     High dose steroids can increase glucose level, look for increased thirst and increased urination and if having these symptoms please speak with your doctor immediately    Please take Pepcid 20mg 2x/day while on prednisone to protect stomach    continue Pred-forte eye drops at 1 drop to left eye 4x/day    Artificial Tears preservative-free four times a in both eyes.     We recommend you follow up with your primary care provider within the next 2-3 days, please bring all of your results with you.     Please follow up with our Ocular Surgeon Dr. Kellee Mills on Tuesday (call and make appointment)    Kellee Mills)  Ophthalmology  [address] [place] [address], [address]  [place] [address]  Phone: [phone]  Fax: [phone]      Please return to the Emergency Department with new, worsening, or concerning symptoms, such as:  -Worsening eye pain/visual loss   -spreading redness at eye, inability to open eye  -Shortness of breath or trouble breathing  -Pressure, pain, tightness in chest  -Facial drooping, arm weakness, or speech difficulty   -Head injury or loss of consciousness   -Nonstop bleeding or an open wound     *More detailed information regarding your visit and discharge can be found by reviewing this packet          Eye Pain    WHAT YOU NEED TO KNOW:    Eye pain may be caused by a problem within your eye. A problem or condition in another body area can also cause pain that travels to your eye. Some causes of eye pain include dry eyes, inflammation, a sinus infection, or a cluster headache.    DISCHARGE INSTRUCTIONS:    Medicines: You may need any of the following:  •Artificial tears are eyedrops that can help moisturize your eyes and relieve your pain. Ask your healthcare provider how often to use artificial tears.      •NSAIDs, such as ibuprofen, help decrease swelling, pain, and fever. This medicine is available with or without a doctor's order. NSAIDs can cause stomach bleeding or kidney problems in certain people. If you take blood thinner medicine, always ask if NSAIDs are safe for you. Always read the medicine label and follow directions. Do not give these medicines to children under 6 months of age without direction from your child's healthcare provider.      •Take your medicine as directed. Contact your healthcare provider if you think your medicine is not helping or if you have side effects. Tell him of her if you are allergic to any medicine. Keep a list of the medicines, vitamins, and herbs you take. Include the amounts, and when and why you take them. Bring the list or the pill bottles to follow-up visits. Carry your medicine list with you in case of an emergency.      Follow up with your healthcare provider as directed: You may be referred to an eye specialist. Write down your questions so you remember to ask them during your visits.    Contact your healthcare provider if:   •You have a fever.      •Your eye pain gets worse when you move your eyes.      •You have questions or concerns about your condition or care.      Return to the emergency department if:   •You have any vision loss.      •You have sudden vision changes such as blurred vision, double vision, or seeing halos around lights.      •You develop severe eye pain.         © Copyright [employer] 2022           back to top                          © Copyright [employer] 2022

## 2022-02-05 NOTE — ED CDU PROVIDER INITIAL DAY NOTE - DETAILS
L eye pain  -Pain control, Eye drops  -MRI  -Opthalmology following  -ANA LUISA Cheek, vitals every 4 hours, frequent reevaluations

## 2022-02-05 NOTE — ED CDU PROVIDER SUBSEQUENT DAY NOTE - HISTORY
No interval changes since initial CDU provider note. Pt without new complaint. NAD VSS. Plan to continue eye drops and obtain MRI in the setting fo L eye pain/swelling/visual changes. Opthalmology following. - MAURA Jauregui

## 2022-02-05 NOTE — ED CDU PROVIDER DISPOSITION NOTE - CARE PROVIDER_API CALL
Kellee Mills)  Ophthalmology  [address]  [place] [address]  Phone: [phone]  Fax: [phone]  Follow Up Time:

## 2022-02-07 ENCOUNTER — NON-APPOINTMENT (OUTPATIENT)
Age: 67
End: 2022-02-07

## 2022-02-07 ENCOUNTER — APPOINTMENT (OUTPATIENT)
Dept: OPHTHALMOLOGY | Facility: CLINIC | Age: 67
End: 2022-02-07
Payer: COMMERCIAL

## 2022-02-07 PROBLEM — G43.909 MIGRAINE, UNSPECIFIED, NOT INTRACTABLE, WITHOUT STATUS MIGRAINOSUS: Chronic | Status: ACTIVE | Noted: 2022-02-04

## 2022-02-07 PROBLEM — E78.5 HYPERLIPIDEMIA, UNSPECIFIED: Chronic | Status: ACTIVE | Noted: 2022-02-04

## 2022-02-07 PROCEDURE — 92014 COMPRE OPH EXAM EST PT 1/>: CPT

## 2022-02-07 PROCEDURE — 92134 CPTRZ OPH DX IMG PST SGM RTA: CPT

## 2022-02-08 LAB — DSDNA AB SER-ACNC: 18 IU/ML — SIGNIFICANT CHANGE UP

## 2022-02-09 LAB — ANA TITR SER: NEGATIVE — SIGNIFICANT CHANGE UP

## 2022-02-10 LAB
AUTO DIFF PNL BLD: NEGATIVE — SIGNIFICANT CHANGE UP
C-ANCA SER-ACNC: NEGATIVE — SIGNIFICANT CHANGE UP
P-ANCA SER-ACNC: NEGATIVE — SIGNIFICANT CHANGE UP

## 2022-02-16 ENCOUNTER — APPOINTMENT (OUTPATIENT)
Dept: OPHTHALMOLOGY | Facility: CLINIC | Age: 67
End: 2022-02-16
Payer: COMMERCIAL

## 2022-02-16 ENCOUNTER — NON-APPOINTMENT (OUTPATIENT)
Age: 67
End: 2022-02-16

## 2022-02-16 PROCEDURE — 92012 INTRM OPH EXAM EST PATIENT: CPT

## 2022-03-07 ENCOUNTER — APPOINTMENT (OUTPATIENT)
Dept: OPHTHALMOLOGY | Facility: CLINIC | Age: 67
End: 2022-03-07
Payer: COMMERCIAL

## 2022-03-07 ENCOUNTER — NON-APPOINTMENT (OUTPATIENT)
Age: 67
End: 2022-03-07

## 2022-03-07 PROCEDURE — 92012 INTRM OPH EXAM EST PATIENT: CPT

## 2022-04-04 ENCOUNTER — NON-APPOINTMENT (OUTPATIENT)
Age: 67
End: 2022-04-04

## 2022-04-04 ENCOUNTER — APPOINTMENT (OUTPATIENT)
Dept: OPHTHALMOLOGY | Facility: CLINIC | Age: 67
End: 2022-04-04
Payer: COMMERCIAL

## 2022-04-04 PROCEDURE — 92012 INTRM OPH EXAM EST PATIENT: CPT

## 2022-04-21 ENCOUNTER — TRANSCRIPTION ENCOUNTER (OUTPATIENT)
Age: 67
End: 2022-04-21

## 2022-04-21 DIAGNOSIS — Z87.42 PERSONAL HISTORY OF OTHER DISEASES OF THE FEMALE GENITAL TRACT: ICD-10-CM

## 2022-05-02 ENCOUNTER — APPOINTMENT (OUTPATIENT)
Dept: OPHTHALMOLOGY | Facility: CLINIC | Age: 67
End: 2022-05-02
Payer: COMMERCIAL

## 2022-05-02 ENCOUNTER — NON-APPOINTMENT (OUTPATIENT)
Age: 67
End: 2022-05-02

## 2022-05-02 PROCEDURE — 92012 INTRM OPH EXAM EST PATIENT: CPT

## 2022-05-03 ENCOUNTER — TRANSCRIPTION ENCOUNTER (OUTPATIENT)
Age: 67
End: 2022-05-03

## 2022-05-14 ENCOUNTER — APPOINTMENT (OUTPATIENT)
Dept: ULTRASOUND IMAGING | Facility: CLINIC | Age: 67
End: 2022-05-14
Payer: COMMERCIAL

## 2022-05-14 ENCOUNTER — OUTPATIENT (OUTPATIENT)
Dept: OUTPATIENT SERVICES | Facility: HOSPITAL | Age: 67
LOS: 1 days | End: 2022-05-14
Payer: COMMERCIAL

## 2022-05-14 DIAGNOSIS — R51.9 HEADACHE, UNSPECIFIED: ICD-10-CM

## 2022-05-14 DIAGNOSIS — Z00.8 ENCOUNTER FOR OTHER GENERAL EXAMINATION: ICD-10-CM

## 2022-05-14 PROCEDURE — 76830 TRANSVAGINAL US NON-OB: CPT

## 2022-05-14 PROCEDURE — 76830 TRANSVAGINAL US NON-OB: CPT | Mod: 26

## 2022-06-06 ENCOUNTER — APPOINTMENT (OUTPATIENT)
Dept: OBGYN | Facility: CLINIC | Age: 67
End: 2022-06-06

## 2022-06-06 VITALS
BODY MASS INDEX: 30.22 KG/M2 | SYSTOLIC BLOOD PRESSURE: 118 MMHG | DIASTOLIC BLOOD PRESSURE: 72 MMHG | HEIGHT: 66 IN | WEIGHT: 188 LBS

## 2022-06-06 DIAGNOSIS — N60.19 DIFFUSE CYSTIC MASTOPATHY OF UNSPECIFIED BREAST: ICD-10-CM

## 2022-06-06 PROCEDURE — 99396 PREV VISIT EST AGE 40-64: CPT

## 2022-06-06 PROCEDURE — 82270 OCCULT BLOOD FECES: CPT

## 2022-06-06 RX ORDER — ATORVASTATIN CALCIUM 10 MG/1
10 TABLET, FILM COATED ORAL
Refills: 0 | Status: DISCONTINUED | COMMUNITY
End: 2022-06-06

## 2022-06-06 RX ORDER — NORTRIPTYLINE HYDROCHLORIDE 25 MG/1
25 CAPSULE ORAL
Refills: 0 | Status: DISCONTINUED | COMMUNITY
End: 2022-06-06

## 2022-06-06 RX ORDER — ESOMEPRAZOLE MAGNESIUM 20 MG/1
20 GRANULE, DELAYED RELEASE ORAL
Refills: 0 | Status: DISCONTINUED | COMMUNITY
End: 2022-06-06

## 2022-06-07 ENCOUNTER — APPOINTMENT (OUTPATIENT)
Dept: OBGYN | Facility: CLINIC | Age: 67
End: 2022-06-07

## 2022-06-07 ENCOUNTER — APPOINTMENT (OUTPATIENT)
Dept: ANTEPARTUM | Facility: CLINIC | Age: 67
End: 2022-06-07

## 2022-06-08 ENCOUNTER — NON-APPOINTMENT (OUTPATIENT)
Age: 67
End: 2022-06-08

## 2022-06-08 LAB — HPV HIGH+LOW RISK DNA PNL CVX: NOT DETECTED

## 2022-06-27 ENCOUNTER — NON-APPOINTMENT (OUTPATIENT)
Age: 67
End: 2022-06-27

## 2022-07-17 LAB
CYTOLOGY CVX/VAG DOC THIN PREP: ABNORMAL
DATE COLLECTED: NORMAL
HEMOCCULT SP1 STL QL: NEGATIVE
QUALITY CONTROL: YES

## 2022-07-17 NOTE — HISTORY OF PRESENT ILLNESS
[Y] : Positive pregnancy history [Menarche Age: ____] : age at menarche was [unfilled] [LMP unknown] : LMP unknown [N] : Patient is not sexually active [unknown] : Patient is unsure of the date of her LMP [Previously active] : previously active [FreeTextEntry1] : Evon is here for an annual exam and she is doing well.  [Mammogramdate] : 7/14/21 [TextBox_19] : BR2 [BreastSonogramDate] : 7/14/21 [PapSmeardate] : 6/25/21 [TextBox_25] : BR2 [TextBox_31] : NEG [HPVDate] : 6/25/21 [TextBox_78] : NEG [PGHxTotal] : 3 [[facility] HospitallTerm] : 3 [[facility] Arizonaiving] : 3

## 2022-07-17 NOTE — DISCUSSION/SUMMARY
[FreeTextEntry1] : During this visit comprehensive counseling was given regarding the following concerns:\par \par 1 We discussed the need for proper nutrition, hydration and exercise.  This includes cardiovascular and pelvic floor exercise.\par \par 2 We discussed the importance of maintaining a proper vaccination schedule and we discussed the utility of the flu vaccine and TDaP, Shingles and Covid 19 with periodic boosters. \par \par 3 We discussed the impact of chronic sun exposure and the risk for skin disease as a result. The benefits of a total body scan by a Dermatologist was reviewed..\par \par 4 We discussed the need for certain supplements for most people which include vitamin D3, calcium rich foods with limitation on calcium supplementation by tabular form to 600 mg  daily.  As part of a bone health program we recommend weightbearing exercises, and some limited sun exposure ( 10-15 minutes daily) to help convert vitamin D to its active form.\par 5) We discussed the importance of an eye exam with dilation and retinal and corneal examination. \par \par

## 2022-07-17 NOTE — PHYSICAL EXAM
[Chaperone Present] : A chaperone was present in the examining room during all aspects of the physical examination [FreeTextEntry1] : Shelby Oates MA [Appropriately responsive] : appropriately responsive [Alert] : alert [No Acute Distress] : no acute distress [Soft] : soft [Non-tender] : non-tender [Oriented x3] : oriented x3 [Examination Of The Breasts] : a normal appearance [No Masses] : no breast masses were palpable [Labia Majora] : normal [Labia Minora] : normal [Normal] : normal [Uterine Adnexae] : normal [No Tenderness] : no tenderness [Nl Sphincter Tone] : normal sphincter tone [FreeTextEntry9] : neg heme

## 2022-07-18 ENCOUNTER — APPOINTMENT (OUTPATIENT)
Dept: OPHTHALMOLOGY | Facility: CLINIC | Age: 67
End: 2022-07-18

## 2022-07-18 ENCOUNTER — NON-APPOINTMENT (OUTPATIENT)
Age: 67
End: 2022-07-18

## 2022-07-18 PROCEDURE — 92134 CPTRZ OPH DX IMG PST SGM RTA: CPT

## 2022-07-18 PROCEDURE — 92012 INTRM OPH EXAM EST PATIENT: CPT

## 2022-09-07 ENCOUNTER — APPOINTMENT (OUTPATIENT)
Dept: RADIOLOGY | Facility: CLINIC | Age: 67
End: 2022-09-07

## 2022-09-07 ENCOUNTER — RESULT REVIEW (OUTPATIENT)
Age: 67
End: 2022-09-07

## 2022-09-07 ENCOUNTER — OUTPATIENT (OUTPATIENT)
Dept: OUTPATIENT SERVICES | Facility: HOSPITAL | Age: 67
LOS: 1 days | End: 2022-09-07
Payer: COMMERCIAL

## 2022-09-07 DIAGNOSIS — Z00.8 ENCOUNTER FOR OTHER GENERAL EXAMINATION: ICD-10-CM

## 2022-09-07 PROCEDURE — 77080 DXA BONE DENSITY AXIAL: CPT | Mod: 26

## 2022-09-07 PROCEDURE — 77080 DXA BONE DENSITY AXIAL: CPT

## 2022-09-10 ENCOUNTER — APPOINTMENT (OUTPATIENT)
Dept: ULTRASOUND IMAGING | Facility: CLINIC | Age: 67
End: 2022-09-10

## 2022-09-10 ENCOUNTER — RESULT REVIEW (OUTPATIENT)
Age: 67
End: 2022-09-10

## 2022-09-10 ENCOUNTER — APPOINTMENT (OUTPATIENT)
Dept: MAMMOGRAPHY | Facility: CLINIC | Age: 67
End: 2022-09-10

## 2022-09-10 ENCOUNTER — OUTPATIENT (OUTPATIENT)
Dept: OUTPATIENT SERVICES | Facility: HOSPITAL | Age: 67
LOS: 1 days | End: 2022-09-10
Payer: COMMERCIAL

## 2022-09-10 DIAGNOSIS — Z00.8 ENCOUNTER FOR OTHER GENERAL EXAMINATION: ICD-10-CM

## 2022-09-10 PROCEDURE — 77063 BREAST TOMOSYNTHESIS BI: CPT

## 2022-09-10 PROCEDURE — 77063 BREAST TOMOSYNTHESIS BI: CPT | Mod: 26

## 2022-09-10 PROCEDURE — 77067 SCR MAMMO BI INCL CAD: CPT | Mod: 26

## 2022-09-10 PROCEDURE — 77067 SCR MAMMO BI INCL CAD: CPT

## 2022-10-17 ENCOUNTER — APPOINTMENT (OUTPATIENT)
Dept: ORTHOPEDIC SURGERY | Facility: CLINIC | Age: 67
End: 2022-10-17

## 2022-10-17 DIAGNOSIS — M79.89 OTHER SPECIFIED SOFT TISSUE DISORDERS: ICD-10-CM

## 2022-10-17 PROCEDURE — 73610 X-RAY EXAM OF ANKLE: CPT | Mod: LT

## 2022-10-17 PROCEDURE — 99204 OFFICE O/P NEW MOD 45 MIN: CPT

## 2022-10-17 NOTE — DISCUSSION/SUMMARY
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  I will call the patient when we have the results of the MRI.

## 2022-10-17 NOTE — PHYSICAL EXAM
[de-identified] : General Appearance: normal without acute distress\par Mental: Alert and oriented x 3\par Psych/affect: appropriate, cooperative\par Gait: [normal] gait\par \par Left ankle\par Skin intact\par Small superficial and mobile nodule palpable over anterior medial aspect of ankle\par Normal range of motion of ankle, nontender to palpation elsewhere [de-identified] : 3 views of the left ankle taken today in the office reviewed–no calcification or abnormalities appreciated

## 2022-10-17 NOTE — HISTORY OF PRESENT ILLNESS
[de-identified] : 67-year-old female presents for mass of her left ankle.  Patient states she first noticed this after her dog ran into her ankle during the summer.  She thought her dog bruised it but she still feels a nodule under the skin.  It has not gone away.  It is tender to palpation but does not otherwise bother her.  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  Denies pain with ambulation or activity.

## 2022-11-05 ENCOUNTER — APPOINTMENT (OUTPATIENT)
Dept: MRI IMAGING | Facility: CLINIC | Age: 67
End: 2022-11-05

## 2022-11-05 ENCOUNTER — OUTPATIENT (OUTPATIENT)
Dept: OUTPATIENT SERVICES | Facility: HOSPITAL | Age: 67
LOS: 1 days | End: 2022-11-05
Payer: COMMERCIAL

## 2022-11-05 DIAGNOSIS — M79.89 OTHER SPECIFIED SOFT TISSUE DISORDERS: ICD-10-CM

## 2022-11-05 PROCEDURE — A9585: CPT

## 2022-11-05 PROCEDURE — 73723 MRI JOINT LWR EXTR W/O&W/DYE: CPT

## 2022-11-05 PROCEDURE — 73723 MRI JOINT LWR EXTR W/O&W/DYE: CPT | Mod: 26,LT

## 2022-11-10 ENCOUNTER — NON-APPOINTMENT (OUTPATIENT)
Age: 67
End: 2022-11-10

## 2022-11-17 ENCOUNTER — NON-APPOINTMENT (OUTPATIENT)
Age: 67
End: 2022-11-17

## 2023-04-10 NOTE — ED ADULT NURSE NOTE - NSFALLRSKASSESSDT_ED_ALL_ED
04-Feb-2022 18:00
no concerns
pt is a 62 yo male with hx HTN< DM2, HLD, BPH, who presents s/p Left ankle fracture  with removal of hematoma, presents with elevated glucose . Pt denies any fever, chills,  sob, cp, n, v, Pt noted to have elevated anion gap.  with glucose,689, bHb 5.0, AG 23, K 7.1.     PE bp 140/74 rr 20  o2 sat 96 heent no jvd, lungs few rhonchi  heart s1s2 abdomen nontender ext no edema    labs glucose 689   bhb 5.0   k 5.9  bicarb 15 cr 3.10    A/P  62 yo with metabolic acidosis and DKA, will admit  to icu for close monitoring   -fs q 1 hrs,  check bmp q 4 hrs, mg, phos  -monitor urine output  -continue abx , orthopedics follow up.  -check echo to evalute lv function  -dvt prophylaxis  -continue ivf  Lr at 150 cc/hr, will switch to d5 lr when fs decrease to 250  -critically ill with dka and metabolic acidosis

## 2023-08-14 ENCOUNTER — APPOINTMENT (OUTPATIENT)
Dept: OBGYN | Facility: CLINIC | Age: 68
End: 2023-08-14
Payer: COMMERCIAL

## 2023-08-14 ENCOUNTER — NON-APPOINTMENT (OUTPATIENT)
Age: 68
End: 2023-08-14

## 2023-08-14 VITALS
BODY MASS INDEX: 27 KG/M2 | DIASTOLIC BLOOD PRESSURE: 73 MMHG | WEIGHT: 168 LBS | HEIGHT: 66 IN | SYSTOLIC BLOOD PRESSURE: 155 MMHG

## 2023-08-14 VITALS — DIASTOLIC BLOOD PRESSURE: 73 MMHG | SYSTOLIC BLOOD PRESSURE: 137 MMHG

## 2023-08-14 DIAGNOSIS — Z12.39 ENCOUNTER FOR OTHER SCREENING FOR MALIGNANT NEOPLASM OF BREAST: ICD-10-CM

## 2023-08-14 DIAGNOSIS — Z86.39 PERSONAL HISTORY OF OTHER ENDOCRINE, NUTRITIONAL AND METABOLIC DISEASE: ICD-10-CM

## 2023-08-14 DIAGNOSIS — Z12.12 ENCOUNTER FOR SCREENING FOR MALIGNANT NEOPLASM OF RECTUM: ICD-10-CM

## 2023-08-14 DIAGNOSIS — Z78.9 OTHER SPECIFIED HEALTH STATUS: ICD-10-CM

## 2023-08-14 DIAGNOSIS — M85.80 OTHER SPECIFIED DISORDERS OF BONE DENSITY AND STRUCTURE, UNSPECIFIED SITE: ICD-10-CM

## 2023-08-14 DIAGNOSIS — Z12.4 ENCOUNTER FOR SCREENING FOR MALIGNANT NEOPLASM OF CERVIX: ICD-10-CM

## 2023-08-14 DIAGNOSIS — Z63.5 DISRUPTION OF FAMILY BY SEPARATION AND DIVORCE: ICD-10-CM

## 2023-08-14 LAB
DATE COLLECTED: NORMAL
HEMOCCULT SP1 STL QL: NEGATIVE
QUALITY CONTROL: YES

## 2023-08-14 PROCEDURE — 82270 OCCULT BLOOD FECES: CPT

## 2023-08-14 PROCEDURE — 99397 PER PM REEVAL EST PAT 65+ YR: CPT

## 2023-08-14 RX ORDER — ROSUVASTATIN CALCIUM 10 MG/1
10 TABLET, FILM COATED ORAL
Refills: 0 | Status: ACTIVE | COMMUNITY

## 2023-08-14 RX ORDER — MAGNESIUM OXIDE/MAG AA CHELATE 300 MG
CAPSULE ORAL
Refills: 0 | Status: ACTIVE | COMMUNITY

## 2023-08-14 SDOH — SOCIAL STABILITY - SOCIAL INSECURITY: DISRUPTION OF FAMILY BY SEPARATION AND DIVORCE: Z63.5

## 2023-08-14 NOTE — PHYSICAL EXAM
[Chaperone Present] : A chaperone was present in the examining room during all aspects of the physical examination [Appropriately responsive] : appropriately responsive [Alert] : alert [No Acute Distress] : no acute distress [No Lymphadenopathy] : no lymphadenopathy [Soft] : soft [Non-tender] : non-tender [Non-distended] : non-distended [No Lesions] : no lesions [No Mass] : no mass [Oriented x3] : oriented x3 [Examination Of The Breasts] : a normal appearance [No Masses] : no breast masses were palpable [Labia Majora] : normal [Labia Minora] : normal [No Bleeding] : There was no active vaginal bleeding [Nabothian Cyst ___ cm] : [unfilled] ~Ucm nabothian cyst [Uterine Adnexae] : normal [FreeTextEntry1] : Christopher Gillis & Natasha Torrez | DIANNA Magana [Vulvar Atrophy] : vulvar atrophy [Dry Mucosa] : dry mucosa [Normal rectal exam] : was normal [Occult Blood Positive] : was negative for occult blood analysis [Normal] : was normal

## 2023-08-14 NOTE — DISCUSSION/SUMMARY
[FreeTextEntry1] : Violet presented today for her annual gynecological exam.  COUNCELING 1. I recommend the patient introduce Vitamin D3 supplements into her diet. (2000 mg of Vitamin D) 2. I recommend the patient start seeing a therapist. She will call Chasity Vargas ([place]) 3. I recommend the patient reduce her salt intake During this visit comprehensive counseling was given regarding the following concerns:   1 We discussed the need for proper nutrition, hydration and exercise.  This includes cardiovascular and pelvic floor exercise.   2 We discussed the importance of maintaining a proper vaccination schedule and we discussed the utility of the flu vaccine and TDaP, Shingles and Covid 19 with periodic boosters.   3 We discussed the impact of chronic sun exposure and the risk for skin disease as a result. The benefits of a total body scan by a Dermatologist were reviewed...   4 We discussed the need for certain supplements for most people which include vitamin D3 (2000 IU daily), calcium rich foods with limitation on calcium supplementation by tabular form to 600 mg daily.  As part of a bone health program, we recommend weightbearing exercises, and some limited sun exposure (10-15 minutes daily) to help convert vitamin D to its active form. 5) We discussed the importance of an eye exam with dilation and retinal and corneal examination.  PLAN 1. Violet can follow-up in 1 year for her next annual exam or as needed. All questions were asked and answered. The patient verbalized understanding the plan.

## 2023-08-14 NOTE — HISTORY OF PRESENT ILLNESS
[LMP unknown] : LMP unknown [Y] : Patient is sexually active [unknown] : Patient is unsure of the date of her LMP [Menarche Age: ____] : age at menarche was [unfilled] [postmenopausal] : postmenopausal [Previously active] : previously active [FreeTextEntry1] : Violet presents today for her annual gynecological exam. Her blood pressure is elevated today. She states her father is in hospice and her ex- recently passed. The patient has not started seeing a therapist but would like to start seeing one due to everything going on in her personal life. The patient has a significant family history of cardiac issues. [Mammogramdate] : 09/10/22 [TextBox_19] : BR 2 [PapSmeardate] : 06/06/22 [TextBox_31] : NEG [BoneDensityDate] : 09/07/22 [TextBox_37] : OSTEOPENIA [ColonoscopyDate] : 2018 [TextBox_43] : wnl [HPVDate] : 06/06/22 [TextBox_78] : NEG [PGHxTotal] : 3 [[facility] Hospit[facility]Term] : 3 [[facility] Hospitaliving] : 3

## 2023-08-15 LAB — HPV HIGH+LOW RISK DNA PNL CVX: NOT DETECTED

## 2023-08-17 LAB — CYTOLOGY CVX/VAG DOC THIN PREP: NORMAL

## 2023-10-09 ENCOUNTER — TRANSCRIPTION ENCOUNTER (OUTPATIENT)
Age: 68
End: 2023-10-09

## 2023-11-24 NOTE — ED CDU PROVIDER DISPOSITION NOTE - PRINCIPAL DIAGNOSIS
Conveyed recommendations to patient    She verbalized understanding and agrees to plan of care    Orders placed, call transferred to PSR for scheduling     No further questions at this time   Idiopathic orbital inflammatory syndrome

## 2023-12-15 ENCOUNTER — TRANSCRIPTION ENCOUNTER (OUTPATIENT)
Age: 68
End: 2023-12-15

## 2023-12-16 ENCOUNTER — TRANSCRIPTION ENCOUNTER (OUTPATIENT)
Age: 68
End: 2023-12-16

## 2024-01-11 ENCOUNTER — APPOINTMENT (OUTPATIENT)
Dept: PAIN MANAGEMENT | Facility: CLINIC | Age: 69
End: 2024-01-11
Payer: COMMERCIAL

## 2024-01-11 VITALS
WEIGHT: 168 LBS | SYSTOLIC BLOOD PRESSURE: 128 MMHG | BODY MASS INDEX: 27 KG/M2 | DIASTOLIC BLOOD PRESSURE: 69 MMHG | HEIGHT: 66 IN | HEART RATE: 65 BPM

## 2024-01-11 PROCEDURE — 99204 OFFICE O/P NEW MOD 45 MIN: CPT

## 2024-01-12 RX ORDER — GALCANEZUMAB 120 MG/ML
120 INJECTION, SOLUTION SUBCUTANEOUS
Qty: 2 | Refills: 0 | Status: ACTIVE | COMMUNITY
Start: 2024-01-11 | End: 1900-01-01

## 2024-02-16 ENCOUNTER — RX RENEWAL (OUTPATIENT)
Age: 69
End: 2024-02-16

## 2024-03-11 RX ORDER — GALCANEZUMAB 120 MG/ML
120 INJECTION, SOLUTION SUBCUTANEOUS
Qty: 1 | Refills: 5 | Status: ACTIVE | COMMUNITY
Start: 2024-01-11 | End: 1900-01-01

## 2024-04-01 ENCOUNTER — TRANSCRIPTION ENCOUNTER (OUTPATIENT)
Age: 69
End: 2024-04-01

## 2024-04-01 RX ORDER — METHYLPREDNISOLONE 4 MG/1
4 TABLET ORAL
Qty: 1 | Refills: 0 | Status: ACTIVE | COMMUNITY
Start: 2024-04-01 | End: 1900-01-01

## 2024-04-06 ENCOUNTER — APPOINTMENT (OUTPATIENT)
Dept: MAMMOGRAPHY | Facility: CLINIC | Age: 69
End: 2024-04-06
Payer: COMMERCIAL

## 2024-04-06 ENCOUNTER — RESULT REVIEW (OUTPATIENT)
Age: 69
End: 2024-04-06

## 2024-04-06 ENCOUNTER — OUTPATIENT (OUTPATIENT)
Dept: OUTPATIENT SERVICES | Facility: HOSPITAL | Age: 69
LOS: 1 days | End: 2024-04-06
Payer: COMMERCIAL

## 2024-04-06 DIAGNOSIS — Z12.39 ENCOUNTER FOR OTHER SCREENING FOR MALIGNANT NEOPLASM OF BREAST: ICD-10-CM

## 2024-04-06 PROCEDURE — 77063 BREAST TOMOSYNTHESIS BI: CPT

## 2024-04-06 PROCEDURE — 77067 SCR MAMMO BI INCL CAD: CPT

## 2024-04-06 PROCEDURE — 77067 SCR MAMMO BI INCL CAD: CPT | Mod: 26

## 2024-04-06 PROCEDURE — 77063 BREAST TOMOSYNTHESIS BI: CPT | Mod: 26

## 2024-04-18 ENCOUNTER — APPOINTMENT (OUTPATIENT)
Dept: PAIN MANAGEMENT | Facility: CLINIC | Age: 69
End: 2024-04-18

## 2024-05-03 ENCOUNTER — RX RENEWAL (OUTPATIENT)
Age: 69
End: 2024-05-03

## 2024-05-03 RX ORDER — UBROGEPANT 100 MG/1
100 TABLET ORAL
Qty: 16 | Refills: 0 | Status: ACTIVE | COMMUNITY
Start: 2024-01-11 | End: 1900-01-01

## 2024-07-22 ENCOUNTER — NON-APPOINTMENT (OUTPATIENT)
Age: 69
End: 2024-07-22

## 2024-07-25 ENCOUNTER — RX RENEWAL (OUTPATIENT)
Age: 69
End: 2024-07-25

## 2024-08-21 ENCOUNTER — APPOINTMENT (OUTPATIENT)
Dept: OBGYN | Facility: CLINIC | Age: 69
End: 2024-08-21

## 2024-08-21 VITALS
DIASTOLIC BLOOD PRESSURE: 78 MMHG | WEIGHT: 168 LBS | BODY MASS INDEX: 27 KG/M2 | SYSTOLIC BLOOD PRESSURE: 154 MMHG | HEIGHT: 66 IN

## 2024-08-21 DIAGNOSIS — Z78.0 ENCOUNTER FOR SCREENING FOR OSTEOPOROSIS: ICD-10-CM

## 2024-08-21 DIAGNOSIS — Z13.820 ENCOUNTER FOR SCREENING FOR OSTEOPOROSIS: ICD-10-CM

## 2024-08-21 PROCEDURE — 82270 OCCULT BLOOD FECES: CPT

## 2024-08-21 PROCEDURE — 99397 PER PM REEVAL EST PAT 65+ YR: CPT

## 2024-08-21 RX ORDER — ESTRADIOL 2 MG/1
7.5 SYSTEM VAGINAL
Qty: 1 | Refills: 3 | Status: ACTIVE | COMMUNITY
Start: 2024-08-21 | End: 1900-01-01

## 2024-08-27 LAB
CYTOLOGY CVX/VAG DOC THIN PREP: ABNORMAL
DATE COLLECTED: NORMAL
HEMOCCULT SP1 STL QL: NEGATIVE
HPV HIGH+LOW RISK DNA PNL CVX: NOT DETECTED
QUALITY CONTROL: YES

## 2024-08-29 ENCOUNTER — NON-APPOINTMENT (OUTPATIENT)
Age: 69
End: 2024-08-29

## 2024-08-29 ENCOUNTER — TRANSCRIPTION ENCOUNTER (OUTPATIENT)
Age: 69
End: 2024-08-29

## 2024-08-30 NOTE — HISTORY OF PRESENT ILLNESS
[LMP unknown] : LMP unknown [postmenopausal] : postmenopausal [unknown] : Patient is unsure of the date of her LMP [Menarche Age: ____] : age at menarche was [unfilled] [Y] : Patient is sexually active [Currently Active] : currently active [FreeTextEntry1] : Violet is here for an annual exam and she is doing well.  [Mammogramdate] : 04/06/2024 [TextBox_19] : BR1 [PapSmeardate] : 08/14/2023 [TextBox_31] : NORMAL [BoneDensityDate] : 09/07/2022 [TextBox_37] : OSTEOPENIA [HPVDate] : 08/14/2023 [TextBox_78] : NEG [PGHxTotal] : 3 [[facility] HospitallTerm] : 3 [[facility] Centeriving] : 3

## 2024-08-30 NOTE — HISTORY OF PRESENT ILLNESS
[LMP unknown] : LMP unknown [postmenopausal] : postmenopausal [unknown] : Patient is unsure of the date of her LMP [Menarche Age: ____] : age at menarche was [unfilled] [Y] : Patient is sexually active [Currently Active] : currently active [FreeTextEntry1] : Violet is here for an annual exam and she is doing well.  [Mammogramdate] : 04/06/2024 [TextBox_19] : BR1 [PapSmeardate] : 08/14/2023 [TextBox_31] : NORMAL [BoneDensityDate] : 09/07/2022 [TextBox_37] : OSTEOPENIA [HPVDate] : 08/14/2023 [TextBox_78] : NEG [PGHxTotal] : 3 [[facility]Term] : 3 [[facility] Hospitaliving] : 3

## 2024-08-30 NOTE — PHYSICAL EXAM
[Chaperone Present] : A chaperone was present in the examining room during all aspects of the physical examination [FreeTextEntry2] : Ele Pearson MA [Appropriately responsive] : appropriately responsive [Alert] : alert [No Acute Distress] : no acute distress [No Lymphadenopathy] : no lymphadenopathy [Regular Rate Rhythm] : regular rate rhythm [Clear to Auscultation B/L] : clear to auscultation bilaterally [Soft] : soft [Non-tender] : non-tender [Non-distended] : non-distended [No Mass] : no mass [Oriented x3] : oriented x3 [Examination Of The Breasts] : a normal appearance [No Masses] : no breast masses were palpable [Labia Majora] : normal [Labia Minora] : normal [Cystocele] : a cystocele [Dry Mucosa] : dry mucosa [Normal] : normal [Uterine Adnexae] : normal [No Tenderness] : no tenderness [Nl Sphincter Tone] : normal sphincter tone [FreeTextEntry9] : Heme negative

## 2024-08-30 NOTE — REVIEW OF SYSTEMS
[Patient Intake Form Reviewed] : Patient intake form was reviewed [Negative] : Heme/Lymph [FreeTextEntry8] : genital prolapse and vaginal dryness

## 2024-08-30 NOTE — PLAN
[FreeTextEntry1] : -normal exam -she is in a new relationship, and we discussed safer sex practices and condoms. She will use Astroglide natural or coconut oil (liquid) as a lubricant.  We discussed the possible utility of an Estring and she will see if she is covered for the expense.  During this visit comprehensive counseling was given regarding the following concerns:   1 We discussed the need for proper nutrition, hydration and exercise.  This includes cardiovascular and pelvic floor exercises.   2 We discussed the importance of maintaining a proper vaccination schedule and we discussed the utility of the currently available vaccines (Flu ,TDaP, Shingles, Covid 19 booster, RSV and Gardasil 9).   3 We discussed the impact of chronic sun exposure and the risk for skin disease as a result. The benefits of a total body scan by a Dermatologist were reviewed, along with sun-protective clothing, eye wear and sunblock.   4 We discussed the need for certain supplements for most people which include vitamin D3 (2000 IU daily), calcium rich foods with limitation on calcium supplementation by tabular form to 600 mg daily.  As part of a bone health program, we recommend weightbearing exercises, and some limited sun exposure (10-15 minutes daily) to help convert vitamin D to its active form.   5) We discussed the importance of an eye exam with dilation and retinal and corneal examination.   6) Mental health is very important and often times under served. We discussed the need for downtime, stress reduction and in the case of feelings such as profound sadness, suicidal or other unusual thoughts--the need to reach out for help.   7) Gun violence is a leading cause of death and injury in children, and it is important to focus on proper storage, gun locks and education of family members if there are guns in the home.

## 2024-08-30 NOTE — PHYSICAL EXAM
[Chaperone Present] : A chaperone was present in the examining room during all aspects of the physical examination [FreeTextEntry2] : Ele Pearson MA [Appropriately responsive] : appropriately responsive [Alert] : alert [No Acute Distress] : no acute distress [No Lymphadenopathy] : no lymphadenopathy [Regular Rate Rhythm] : regular rate rhythm [Clear to Auscultation B/L] : clear to auscultation bilaterally [Soft] : soft [Non-tender] : non-tender [Non-distended] : non-distended [No Mass] : no mass [Oriented x3] : oriented x3 [Examination Of The Breasts] : a normal appearance [No Masses] : no breast masses were palpable [Labia Majora] : normal [Labia Minora] : normal [Dry Mucosa] : dry mucosa [Cystocele] : a cystocele [Normal] : normal [Uterine Adnexae] : normal [No Tenderness] : no tenderness [Nl Sphincter Tone] : normal sphincter tone [FreeTextEntry9] : Heme negative

## 2024-09-04 ENCOUNTER — APPOINTMENT (OUTPATIENT)
Dept: RADIOLOGY | Facility: CLINIC | Age: 69
End: 2024-09-04
Payer: COMMERCIAL

## 2024-09-04 ENCOUNTER — OUTPATIENT (OUTPATIENT)
Dept: OUTPATIENT SERVICES | Facility: HOSPITAL | Age: 69
LOS: 1 days | End: 2024-09-04
Payer: COMMERCIAL

## 2024-09-04 DIAGNOSIS — Z13.820 ENCOUNTER FOR SCREENING FOR OSTEOPOROSIS: ICD-10-CM

## 2024-09-04 DIAGNOSIS — Z00.8 ENCOUNTER FOR OTHER GENERAL EXAMINATION: ICD-10-CM

## 2024-09-04 PROCEDURE — 77085 DXA BONE DENSITY AXL VRT FX: CPT | Mod: 26

## 2024-09-04 PROCEDURE — 77085 DXA BONE DENSITY AXL VRT FX: CPT

## 2024-09-05 ENCOUNTER — TRANSCRIPTION ENCOUNTER (OUTPATIENT)
Age: 69
End: 2024-09-05

## 2024-09-11 ENCOUNTER — NON-APPOINTMENT (OUTPATIENT)
Age: 69
End: 2024-09-11

## 2024-09-23 ENCOUNTER — APPOINTMENT (OUTPATIENT)
Dept: PAIN MANAGEMENT | Facility: CLINIC | Age: 69
End: 2024-09-23
Payer: COMMERCIAL

## 2024-09-23 PROCEDURE — 99212 OFFICE O/P EST SF 10 MIN: CPT | Mod: 95

## 2024-09-23 RX ORDER — KETOROLAC TROMETHAMINE 10 MG/1
10 TABLET, FILM COATED ORAL
Qty: 12 | Refills: 0 | Status: ACTIVE | COMMUNITY
Start: 2024-09-23 | End: 1900-01-01

## 2024-09-23 NOTE — HISTORY OF PRESENT ILLNESS
[FreeTextEntry1] : TEB today to discuss flare up of migraine that has been going on for the past 2-3 weeks. Pt took prescribed medrolpak , pt provided temporary relief but the migraine returned.  Taking Emgality 1x/ month and prn Ubrelvy.  Ubrelvy has not been too helpful during this exacerbation.   Pt has had some numbness to right face during migraine.

## 2024-09-23 NOTE — REASON FOR VISIT
[Home] : at home, [unfilled] , at the time of the visit. [Medical Office: ([facility])___] : at the medical office located in  [This encounter was initiated by telehealth (audio with video) and converted to telephone (audio only) due to technical difficulties.] : This encounter was initiated by telehealth (audio with video) and converted to telephone (audio only) due to technical difficulties. [Follow-Up: _____] : a [unfilled] follow-up visit

## 2024-09-28 ENCOUNTER — OUTPATIENT (OUTPATIENT)
Dept: OUTPATIENT SERVICES | Facility: HOSPITAL | Age: 69
LOS: 1 days | End: 2024-09-28
Payer: COMMERCIAL

## 2024-09-28 ENCOUNTER — APPOINTMENT (OUTPATIENT)
Dept: MRI IMAGING | Facility: CLINIC | Age: 69
End: 2024-09-28

## 2024-09-28 DIAGNOSIS — G43.109 MIGRAINE WITH AURA, NOT INTRACTABLE, WITHOUT STATUS MIGRAINOSUS: ICD-10-CM

## 2024-09-28 PROCEDURE — 70551 MRI BRAIN STEM W/O DYE: CPT

## 2024-09-28 PROCEDURE — 70551 MRI BRAIN STEM W/O DYE: CPT | Mod: 26

## 2024-10-01 ENCOUNTER — TRANSCRIPTION ENCOUNTER (OUTPATIENT)
Age: 69
End: 2024-10-01

## 2024-10-04 ENCOUNTER — APPOINTMENT (OUTPATIENT)
Dept: OTOLARYNGOLOGY | Facility: CLINIC | Age: 69
End: 2024-10-04
Payer: COMMERCIAL

## 2024-10-04 VITALS
SYSTOLIC BLOOD PRESSURE: 134 MMHG | BODY MASS INDEX: 27 KG/M2 | HEIGHT: 66 IN | WEIGHT: 168 LBS | HEART RATE: 67 BPM | DIASTOLIC BLOOD PRESSURE: 75 MMHG | OXYGEN SATURATION: 98 %

## 2024-10-04 PROBLEM — H93.8X1 SENSATION OF PLUGGED EAR ON RIGHT SIDE: Status: ACTIVE | Noted: 2024-10-04

## 2024-10-04 PROBLEM — H61.21 EXCESSIVE CERUMEN IN RIGHT EAR CANAL: Status: ACTIVE | Noted: 2024-10-04

## 2024-10-04 PROCEDURE — 92567 TYMPANOMETRY: CPT

## 2024-10-04 PROCEDURE — 99204 OFFICE O/P NEW MOD 45 MIN: CPT | Mod: 25

## 2024-10-04 PROCEDURE — G0268 REMOVAL OF IMPACTED WAX MD: CPT

## 2024-10-04 PROCEDURE — 92557 COMPREHENSIVE HEARING TEST: CPT

## 2024-10-04 RX ORDER — FLUTICASONE PROPIONATE 50 UG/1
50 SPRAY, METERED NASAL DAILY
Qty: 1 | Refills: 3 | Status: ACTIVE | COMMUNITY
Start: 2024-10-04 | End: 1900-01-01

## 2024-10-04 NOTE — CONSULT LETTER
[Dear  ___] : Dear  [unfilled], [Consult Letter:] : I had the pleasure of evaluating your patient, [unfilled]. [Please see my note below.] : Please see my note below. [Consult Closing:] : Thank you very much for allowing me to participate in the care of this patient.  If you have any questions, please do not hesitate to contact me. [FreeTextEntry3] : Sincerely,   Swati Novak MD Otolaryngology- Facial Plastics  [address] [place] [id number] (P) - [phone] (F) - [phone]

## 2024-10-04 NOTE — HISTORY OF PRESENT ILLNESS
[de-identified] : Ms. MONTELONGO is a 69 year female c/o worsening migraines and now with R cheek numbness and ear clogging for 4-6 weeks, ear clogging is persistent, numbness comes and goes, occasional tinnitus. pt does not recall prior illness and no recent travel. - denies sinus pressure, rhinorrhea, nasal obstruction 9/28/24 MRI brain reports inflammatory changes in both temporal bones and mastoid opacification- images personally reviewed - no sinus dz.  ++ mastoid fluid on Emgality and Ubrelvy, recently seen by Neuro and tx with steroids x2 which helped with both numbness and clogging but symptoms return immediately after stopping

## 2024-10-04 NOTE — DATA REVIEWED
[de-identified] : 10/03/24: type C tymp right, type A left wnl to mod severe SNHL (cond component at 250 Hz on right) [de-identified] : 9/28/24 MRI brain reports inflammatory changes in both temporal bones and mastoid opacification, no sinus disease

## 2024-10-04 NOTE — PHYSICAL EXAM
[Normal] : mucosa is normal [Midline] : trachea located in midline position [de-identified] : b/l cerumen  right > left

## 2024-10-04 NOTE — END OF VISIT
[FreeTextEntry3] : I personally saw and examined ÁNGEL MONTELONGO  in detail. I spoke to MORGAN Zamora regarding the assessment and plan of care. I performed the procedures and relevant physical exam. I have made changes to the body of the note wherever necessary and appropriate.

## 2024-10-04 NOTE — ASSESSMENT
[FreeTextEntry1] : b/l ear clogging:  - cerumen impaction removed today - type C tymp right - MRI reviewed - no NP mass, effusions b/l in mastoid, type C tymp  - flonase x 6 weeks - f/up 6 weeks  Facial numbness:  - more likely related to migraine - no facial masses or sinus dz on MRI  - unrelated to ear fluid

## 2024-10-07 ENCOUNTER — TRANSCRIPTION ENCOUNTER (OUTPATIENT)
Age: 69
End: 2024-10-07

## 2024-10-08 ENCOUNTER — APPOINTMENT (OUTPATIENT)
Dept: INTERNAL MEDICINE | Facility: CLINIC | Age: 69
End: 2024-10-08
Payer: COMMERCIAL

## 2024-10-08 VITALS
TEMPERATURE: 98.6 F | HEIGHT: 66 IN | WEIGHT: 173 LBS | BODY MASS INDEX: 27.8 KG/M2 | HEART RATE: 63 BPM | OXYGEN SATURATION: 98 % | DIASTOLIC BLOOD PRESSURE: 76 MMHG | SYSTOLIC BLOOD PRESSURE: 122 MMHG

## 2024-10-08 DIAGNOSIS — M21.6X2 OTHER ACQUIRED DEFORMITIES OF LEFT FOOT: ICD-10-CM

## 2024-10-08 DIAGNOSIS — M67.88 OTHER SPECIFIED DISORDERS OF SYNOVIUM AND TENDON, OTHER SITE: ICD-10-CM

## 2024-10-08 DIAGNOSIS — Z11.51 ENCOUNTER FOR SCREENING FOR HUMAN PAPILLOMAVIRUS (HPV): ICD-10-CM

## 2024-10-08 DIAGNOSIS — Z12.4 ENCOUNTER FOR SCREENING FOR MALIGNANT NEOPLASM OF CERVIX: ICD-10-CM

## 2024-10-08 DIAGNOSIS — Z63.5 DISRUPTION OF FAMILY BY SEPARATION AND DIVORCE: ICD-10-CM

## 2024-10-08 DIAGNOSIS — S86.319A STRAIN OF MUSCLE(S) AND TENDON(S) OF PERONEAL MUSCLE GROUP AT LOWER LEG LEVEL, UNSPECIFIED LEG, INITIAL ENCOUNTER: ICD-10-CM

## 2024-10-08 DIAGNOSIS — Z87.42 PERSONAL HISTORY OF OTHER DISEASES OF THE FEMALE GENITAL TRACT: ICD-10-CM

## 2024-10-08 DIAGNOSIS — Z12.39 ENCOUNTER FOR OTHER SCREENING FOR MALIGNANT NEOPLASM OF BREAST: ICD-10-CM

## 2024-10-08 DIAGNOSIS — Z87.2 PERSONAL HISTORY OF DISEASES OF THE SKIN AND SUBCUTANEOUS TISSUE: ICD-10-CM

## 2024-10-08 DIAGNOSIS — G43.109 MIGRAINE WITH AURA, NOT INTRACTABLE, W/OUT STATUS MIGRAINOSUS: ICD-10-CM

## 2024-10-08 DIAGNOSIS — Z12.12 ENCOUNTER FOR SCREENING FOR MALIGNANT NEOPLASM OF RECTUM: ICD-10-CM

## 2024-10-08 DIAGNOSIS — Z13.820 ENCOUNTER FOR SCREENING FOR OSTEOPOROSIS: ICD-10-CM

## 2024-10-08 DIAGNOSIS — M79.89 OTHER SPECIFIED SOFT TISSUE DISORDERS: ICD-10-CM

## 2024-10-08 DIAGNOSIS — N83.9 NONINFLAMMATORY DISORDER OF OVARY, FALLOPIAN TUBE AND BROAD LIGAMENT, UNSPECIFIED: ICD-10-CM

## 2024-10-08 DIAGNOSIS — M62.462 CONTRACTURE OF MUSCLE, LEFT LOWER LEG: ICD-10-CM

## 2024-10-08 DIAGNOSIS — R23.8 OTHER SKIN CHANGES: ICD-10-CM

## 2024-10-08 DIAGNOSIS — H93.8X1 OTHER SPECIFIED DISORDERS OF RIGHT EAR: ICD-10-CM

## 2024-10-08 DIAGNOSIS — N81.4 UTEROVAGINAL PROLAPSE, UNSPECIFIED: ICD-10-CM

## 2024-10-08 DIAGNOSIS — Z86.018 PERSONAL HISTORY OF OTHER BENIGN NEOPLASM: ICD-10-CM

## 2024-10-08 DIAGNOSIS — H61.21 IMPACTED CERUMEN, RIGHT EAR: ICD-10-CM

## 2024-10-08 DIAGNOSIS — Z87.898 PERSONAL HISTORY OF OTHER SPECIFIED CONDITIONS: ICD-10-CM

## 2024-10-08 DIAGNOSIS — M21.6X1 OTHER ACQUIRED DEFORMITIES OF RIGHT FOOT: ICD-10-CM

## 2024-10-08 DIAGNOSIS — R10.2 PELVIC AND PERINEAL PAIN: ICD-10-CM

## 2024-10-08 DIAGNOSIS — N60.19 DIFFUSE CYSTIC MASTOPATHY OF UNSPECIFIED BREAST: ICD-10-CM

## 2024-10-08 DIAGNOSIS — Z12.83 ENCOUNTER FOR SCREENING FOR MALIGNANT NEOPLASM OF SKIN: ICD-10-CM

## 2024-10-08 DIAGNOSIS — M85.80 OTHER SPECIFIED DISORDERS OF BONE DENSITY AND STRUCTURE, UNSPECIFIED SITE: ICD-10-CM

## 2024-10-08 DIAGNOSIS — M79.672 PAIN IN LEFT FOOT: ICD-10-CM

## 2024-10-08 DIAGNOSIS — Z78.0 ENCOUNTER FOR SCREENING FOR OSTEOPOROSIS: ICD-10-CM

## 2024-10-08 DIAGNOSIS — M21.6X2 OTHER ACQUIRED DEFORMITIES OF RIGHT FOOT: ICD-10-CM

## 2024-10-08 DIAGNOSIS — Z86.79 PERSONAL HISTORY OF OTHER DISEASES OF THE CIRCULATORY SYSTEM: ICD-10-CM

## 2024-10-08 DIAGNOSIS — Z00.00 ENCOUNTER FOR GENERAL ADULT MEDICAL EXAMINATION W/OUT ABNORMAL FINDINGS: ICD-10-CM

## 2024-10-08 PROCEDURE — 99387 INIT PM E/M NEW PAT 65+ YRS: CPT

## 2024-10-08 RX ORDER — CHROMIUM 200 MCG
TABLET ORAL
Refills: 0 | Status: ACTIVE | COMMUNITY

## 2024-10-08 SDOH — SOCIAL STABILITY - SOCIAL INSECURITY: DISRUPTION OF FAMILY BY SEPARATION AND DIVORCE: Z63.5

## 2024-10-10 ENCOUNTER — RX RENEWAL (OUTPATIENT)
Age: 69
End: 2024-10-10

## 2024-10-10 LAB
25(OH)D3 SERPL-MCNC: 42.9 NG/ML
ALBUMIN SERPL ELPH-MCNC: 4.9 G/DL
ALP BLD-CCNC: 70 U/L
ALT SERPL-CCNC: 16 U/L
ANION GAP SERPL CALC-SCNC: 11 MMOL/L
AST SERPL-CCNC: 16 U/L
BILIRUB SERPL-MCNC: 0.4 MG/DL
BUN SERPL-MCNC: 15 MG/DL
CALCIUM SERPL-MCNC: 10.5 MG/DL
CHLORIDE SERPL-SCNC: 103 MMOL/L
CHOLEST SERPL-MCNC: 197 MG/DL
CO2 SERPL-SCNC: 28 MMOL/L
CREAT SERPL-MCNC: 0.78 MG/DL
EGFR: 83 ML/MIN/1.73M2
ESTIMATED AVERAGE GLUCOSE: 114 MG/DL
GLUCOSE SERPL-MCNC: 99 MG/DL
HBA1C MFR BLD HPLC: 5.6 %
HCT VFR BLD CALC: 43.1 %
HDLC SERPL-MCNC: 94 MG/DL
HGB BLD-MCNC: 14.2 G/DL
IRON SATN MFR SERPL: 20 %
IRON SERPL-MCNC: 78 UG/DL
LDLC SERPL CALC-MCNC: 90 MG/DL
MCHC RBC-ENTMCNC: 31.8 PG
MCHC RBC-ENTMCNC: 32.9 GM/DL
MCV RBC AUTO: 96.6 FL
NONHDLC SERPL-MCNC: 103 MG/DL
PLATELET # BLD AUTO: 264 K/UL
POTASSIUM SERPL-SCNC: 5 MMOL/L
PROT SERPL-MCNC: 7 G/DL
RBC # BLD: 4.46 M/UL
RBC # FLD: 12.7 %
SODIUM SERPL-SCNC: 142 MMOL/L
TIBC SERPL-MCNC: 391 UG/DL
TRIGL SERPL-MCNC: 73 MG/DL
TSH SERPL-ACNC: 1.37 UIU/ML
UIBC SERPL-MCNC: 313 UG/DL
WBC # FLD AUTO: 5.54 K/UL

## 2024-10-14 ENCOUNTER — TRANSCRIPTION ENCOUNTER (OUTPATIENT)
Age: 69
End: 2024-10-14

## 2024-11-05 ENCOUNTER — RX RENEWAL (OUTPATIENT)
Age: 69
End: 2024-11-05

## 2024-11-08 ENCOUNTER — APPOINTMENT (OUTPATIENT)
Dept: OTOLARYNGOLOGY | Facility: CLINIC | Age: 69
End: 2024-11-08
Payer: COMMERCIAL

## 2024-11-08 VITALS
HEIGHT: 66 IN | BODY MASS INDEX: 26.84 KG/M2 | OXYGEN SATURATION: 96 % | SYSTOLIC BLOOD PRESSURE: 117 MMHG | WEIGHT: 167 LBS | HEART RATE: 69 BPM | DIASTOLIC BLOOD PRESSURE: 77 MMHG

## 2024-11-08 DIAGNOSIS — G43.109 MIGRAINE WITH AURA, NOT INTRACTABLE, W/OUT STATUS MIGRAINOSUS: ICD-10-CM

## 2024-11-08 DIAGNOSIS — H93.8X1 OTHER SPECIFIED DISORDERS OF RIGHT EAR: ICD-10-CM

## 2024-11-08 PROCEDURE — 92567 TYMPANOMETRY: CPT

## 2024-11-08 PROCEDURE — 99213 OFFICE O/P EST LOW 20 MIN: CPT

## 2025-01-23 ENCOUNTER — RX RENEWAL (OUTPATIENT)
Age: 70
End: 2025-01-23

## 2025-02-14 ENCOUNTER — NON-APPOINTMENT (OUTPATIENT)
Age: 70
End: 2025-02-14

## 2025-02-14 ENCOUNTER — APPOINTMENT (OUTPATIENT)
Dept: OPHTHALMOLOGY | Facility: CLINIC | Age: 70
End: 2025-02-14
Payer: COMMERCIAL

## 2025-02-14 PROCEDURE — 92014 COMPRE OPH EXAM EST PT 1/>: CPT

## 2025-04-03 ENCOUNTER — NON-APPOINTMENT (OUTPATIENT)
Age: 70
End: 2025-04-03

## 2025-04-04 ENCOUNTER — APPOINTMENT (OUTPATIENT)
Dept: INTERNAL MEDICINE | Facility: CLINIC | Age: 70
End: 2025-04-04
Payer: COMMERCIAL

## 2025-04-04 VITALS
HEIGHT: 66 IN | TEMPERATURE: 98 F | OXYGEN SATURATION: 99 % | BODY MASS INDEX: 27.16 KG/M2 | RESPIRATION RATE: 16 BRPM | WEIGHT: 169 LBS | HEART RATE: 72 BPM | DIASTOLIC BLOOD PRESSURE: 78 MMHG | SYSTOLIC BLOOD PRESSURE: 120 MMHG

## 2025-04-04 DIAGNOSIS — G50.0 TRIGEMINAL NEURALGIA: ICD-10-CM

## 2025-04-04 DIAGNOSIS — G43.109 MIGRAINE WITH AURA, NOT INTRACTABLE, W/OUT STATUS MIGRAINOSUS: ICD-10-CM

## 2025-04-04 DIAGNOSIS — R21 RASH AND OTHER NONSPECIFIC SKIN ERUPTION: ICD-10-CM

## 2025-04-04 DIAGNOSIS — M85.80 OTHER SPECIFIED DISORDERS OF BONE DENSITY AND STRUCTURE, UNSPECIFIED SITE: ICD-10-CM

## 2025-04-04 DIAGNOSIS — Z12.39 ENCOUNTER FOR OTHER SCREENING FOR MALIGNANT NEOPLASM OF BREAST: ICD-10-CM

## 2025-04-04 DIAGNOSIS — I73.00 RAYNAUD'S SYNDROME W/OUT GANGRENE: ICD-10-CM

## 2025-04-04 PROCEDURE — 99214 OFFICE O/P EST MOD 30 MIN: CPT

## 2025-04-04 PROCEDURE — G2211 COMPLEX E/M VISIT ADD ON: CPT

## 2025-04-04 RX ORDER — NORTRIPTYLINE HYDROCHLORIDE 75 MG/1
CAPSULE ORAL AT BEDTIME
Refills: 0 | Status: ACTIVE | COMMUNITY

## 2025-04-07 LAB
ALBUMIN SERPL ELPH-MCNC: 4.8 G/DL
ALP BLD-CCNC: 73 U/L
ALT SERPL-CCNC: 35 U/L
ANION GAP SERPL CALC-SCNC: 12 MMOL/L
AST SERPL-CCNC: 33 U/L
BILIRUB SERPL-MCNC: 0.4 MG/DL
BUN SERPL-MCNC: 16 MG/DL
CALCIUM SERPL-MCNC: 10.1 MG/DL
CHLORIDE SERPL-SCNC: 103 MMOL/L
CHOLEST SERPL-MCNC: 199 MG/DL
CO2 SERPL-SCNC: 24 MMOL/L
CREAT SERPL-MCNC: 0.72 MG/DL
CRP SERPL-MCNC: <3 MG/L
EGFRCR SERPLBLD CKD-EPI 2021: 92 ML/MIN/1.73M2
ERYTHROCYTE [SEDIMENTATION RATE] IN BLOOD BY WESTERGREN METHOD: 22 MM/HR
GLUCOSE SERPL-MCNC: 88 MG/DL
HDLC SERPL-MCNC: 95 MG/DL
LDLC SERPL-MCNC: 90 MG/DL
NONHDLC SERPL-MCNC: 105 MG/DL
POTASSIUM SERPL-SCNC: 3.9 MMOL/L
PROT SERPL-MCNC: 7.2 G/DL
RHEUMATOID FACT SER QL: <10 IU/ML
SODIUM SERPL-SCNC: 140 MMOL/L
TRIGL SERPL-MCNC: 85 MG/DL

## 2025-04-09 LAB — ANA SER IF-ACNC: NEGATIVE

## 2025-04-11 ENCOUNTER — APPOINTMENT (OUTPATIENT)
Dept: OBGYN | Facility: CLINIC | Age: 70
End: 2025-04-11
Payer: COMMERCIAL

## 2025-04-11 VITALS
DIASTOLIC BLOOD PRESSURE: 79 MMHG | BODY MASS INDEX: 27.16 KG/M2 | SYSTOLIC BLOOD PRESSURE: 137 MMHG | HEIGHT: 66 IN | WEIGHT: 169 LBS

## 2025-04-11 DIAGNOSIS — Z11.3 ENCOUNTER FOR SCREENING FOR INFECTIONS WITH A PREDOMINANTLY SEXUAL MODE OF TRANSMISSION: ICD-10-CM

## 2025-04-11 DIAGNOSIS — B37.0 CANDIDAL STOMATITIS: ICD-10-CM

## 2025-04-11 DIAGNOSIS — N89.8 OTHER SPECIFIED NONINFLAMMATORY DISORDERS OF VAGINA: ICD-10-CM

## 2025-04-11 PROCEDURE — 99214 OFFICE O/P EST MOD 30 MIN: CPT

## 2025-04-11 RX ORDER — FLUCONAZOLE 150 MG/1
150 TABLET ORAL
Qty: 2 | Refills: 2 | Status: ACTIVE | COMMUNITY
Start: 2025-04-11 | End: 1900-01-01

## 2025-04-11 RX ORDER — NYSTATIN 100000 [USP'U]/ML
100000 SUSPENSION ORAL 4 TIMES DAILY
Qty: 1 | Refills: 1 | Status: ACTIVE | COMMUNITY
Start: 2025-04-11 | End: 1900-01-01

## 2025-04-12 ENCOUNTER — OUTPATIENT (OUTPATIENT)
Dept: OUTPATIENT SERVICES | Facility: HOSPITAL | Age: 70
LOS: 1 days | End: 2025-04-12
Payer: COMMERCIAL

## 2025-04-12 ENCOUNTER — APPOINTMENT (OUTPATIENT)
Dept: MAMMOGRAPHY | Facility: CLINIC | Age: 70
End: 2025-04-12
Payer: COMMERCIAL

## 2025-04-12 ENCOUNTER — RESULT REVIEW (OUTPATIENT)
Age: 70
End: 2025-04-12

## 2025-04-12 DIAGNOSIS — Z00.8 ENCOUNTER FOR OTHER GENERAL EXAMINATION: ICD-10-CM

## 2025-04-12 DIAGNOSIS — Z12.39 ENCOUNTER FOR OTHER SCREENING FOR MALIGNANT NEOPLASM OF BREAST: ICD-10-CM

## 2025-04-12 PROCEDURE — 77063 BREAST TOMOSYNTHESIS BI: CPT | Mod: 26

## 2025-04-12 PROCEDURE — 77067 SCR MAMMO BI INCL CAD: CPT | Mod: 26

## 2025-04-12 PROCEDURE — 77067 SCR MAMMO BI INCL CAD: CPT

## 2025-04-12 PROCEDURE — 77063 BREAST TOMOSYNTHESIS BI: CPT

## 2025-04-13 LAB
CANDIDA VAG CYTO: NOT DETECTED
G VAGINALIS+PREV SP MTYP VAG QL MICRO: NOT DETECTED
HIV1+2 AB SPEC QL IA.RAPID: NONREACTIVE
T VAGINALIS VAG QL WET PREP: NOT DETECTED

## 2025-04-14 ENCOUNTER — NON-APPOINTMENT (OUTPATIENT)
Age: 70
End: 2025-04-14

## 2025-04-14 ENCOUNTER — TRANSCRIPTION ENCOUNTER (OUTPATIENT)
Age: 70
End: 2025-04-14

## 2025-04-14 DIAGNOSIS — E78.5 HYPERLIPIDEMIA, UNSPECIFIED: ICD-10-CM

## 2025-04-14 LAB
C TRACH RRNA SPEC QL NAA+PROBE: NOT DETECTED
HBV SURFACE AG SER QL: NONREACTIVE
HCV AB SER QL: NONREACTIVE
HCV S/CO RATIO: 0.33 S/CO
N GONORRHOEA RRNA SPEC QL NAA+PROBE: NOT DETECTED
SOURCE AMPLIFICATION: NORMAL
T PALLIDUM AB SER QL IA: NEGATIVE

## 2025-04-17 LAB — BACTERIA UR CULT: NORMAL

## 2025-04-20 LAB
MYCOPLASMA HOMINIS CULTURE: NEGATIVE
UREAPLASMA CULTURE: NEGATIVE

## 2025-04-22 ENCOUNTER — TRANSCRIPTION ENCOUNTER (OUTPATIENT)
Age: 70
End: 2025-04-22

## 2025-04-23 ENCOUNTER — TRANSCRIPTION ENCOUNTER (OUTPATIENT)
Age: 70
End: 2025-04-23

## 2025-04-24 ENCOUNTER — RX RENEWAL (OUTPATIENT)
Age: 70
End: 2025-04-24

## 2025-06-24 ENCOUNTER — RX RENEWAL (OUTPATIENT)
Age: 70
End: 2025-06-24

## 2025-07-16 ENCOUNTER — TRANSCRIPTION ENCOUNTER (OUTPATIENT)
Age: 70
End: 2025-07-16

## 2025-07-16 RX ORDER — NORTRIPTYLINE HYDROCHLORIDE 10 MG/5ML
10 SOLUTION ORAL DAILY
Qty: 1 | Refills: 2 | Status: ACTIVE | COMMUNITY
Start: 2025-07-16 | End: 1900-01-01

## 2025-08-28 ENCOUNTER — RX RENEWAL (OUTPATIENT)
Age: 70
End: 2025-08-28

## 2025-09-08 ENCOUNTER — APPOINTMENT (OUTPATIENT)
Dept: OBGYN | Facility: CLINIC | Age: 70
End: 2025-09-08
Payer: COMMERCIAL

## 2025-09-08 ENCOUNTER — NON-APPOINTMENT (OUTPATIENT)
Age: 70
End: 2025-09-08

## 2025-09-08 ENCOUNTER — LABORATORY RESULT (OUTPATIENT)
Age: 70
End: 2025-09-08

## 2025-09-08 VITALS
HEIGHT: 66 IN | SYSTOLIC BLOOD PRESSURE: 136 MMHG | BODY MASS INDEX: 27.16 KG/M2 | WEIGHT: 169 LBS | DIASTOLIC BLOOD PRESSURE: 72 MMHG

## 2025-09-08 DIAGNOSIS — Z12.4 ENCOUNTER FOR SCREENING FOR MALIGNANT NEOPLASM OF CERVIX: ICD-10-CM

## 2025-09-08 DIAGNOSIS — N95.2 POSTMENOPAUSAL ATROPHIC VAGINITIS: ICD-10-CM

## 2025-09-08 PROCEDURE — 99459 PELVIC EXAMINATION: CPT

## 2025-09-08 PROCEDURE — 82270 OCCULT BLOOD FECES: CPT

## 2025-09-08 PROCEDURE — 99397 PER PM REEVAL EST PAT 65+ YR: CPT

## 2025-09-08 RX ORDER — ESTRADIOL 0.1 MG/G
0.1 CREAM VAGINAL
Qty: 2 | Refills: 2 | Status: ACTIVE | COMMUNITY
Start: 2025-09-08 | End: 1900-01-01

## 2025-09-10 ENCOUNTER — TRANSCRIPTION ENCOUNTER (OUTPATIENT)
Age: 70
End: 2025-09-10

## 2025-09-10 ENCOUNTER — NON-APPOINTMENT (OUTPATIENT)
Age: 70
End: 2025-09-10

## 2025-09-14 LAB
CYTOLOGY CVX/VAG DOC THIN PREP: ABNORMAL
DATE COLLECTED: NORMAL
HEMOCCULT SP1 STL QL: NEGATIVE
HPV HIGH+LOW RISK DNA PNL CVX: DETECTED

## 2025-09-15 ENCOUNTER — TRANSCRIPTION ENCOUNTER (OUTPATIENT)
Age: 70
End: 2025-09-15

## 2025-09-26 PROBLEM — Z12.11 ENCOUNTER FOR SCREENING COLONOSCOPY: Status: ACTIVE | Noted: 2025-09-26 | Resolved: 2025-10-10

## 2025-09-26 PROBLEM — R10.9 ABDOMINAL PAIN: Status: ACTIVE | Noted: 2025-09-26

## 2025-09-26 PROBLEM — R10.31 ABDOMINAL PAIN, RLQ (RIGHT LOWER QUADRANT): Status: ACTIVE | Noted: 2025-09-26
